# Patient Record
Sex: FEMALE | Race: WHITE | NOT HISPANIC OR LATINO | Employment: FULL TIME | URBAN - METROPOLITAN AREA
[De-identification: names, ages, dates, MRNs, and addresses within clinical notes are randomized per-mention and may not be internally consistent; named-entity substitution may affect disease eponyms.]

---

## 2002-04-02 LAB
ERYTHROCYTE [DISTWIDTH] IN BLOOD BY AUTOMATED COUNT: 12.7 % (ref 11.5–14.5)
HCT VFR BLD AUTO: 39.8 % (ref 37–47)
HEMOGLOBIN: 13.3 G/DL (ref 12–16)
MCV RBC AUTO: 97 FL (ref 80–100)
PLATELET # BLD AUTO: 264 10^9/L (ref 150–400)
RBC # BLD AUTO: 4.12 10^12/L (ref 4.2–5.4)
WBC # BLD AUTO: 8.7 10^9/L (ref 4.3–10.8)

## 2007-10-02 LAB — TSH SERPL-ACNC: 1.05 MCU/ML (ref 0.4–4.5)

## 2010-07-06 LAB
AST SERPL-CCNC: 22 U/L (ref 10–30)
CHOLEST SERPL-MCNC: 244 MG/DL (ref 125–200)
CHOLEST/HDLC SERPL: 2.3 {RATIO}
CREAT SERPL-MCNC: 0.82 MG/DL (ref 0.59–1.07)
GFR SERPL CREATININE-BSD FRML MDRD: >60 ML/MIN/1.73M2
GLUCOSE SERPL-MCNC: 75 MG/DL (ref 65–99)
HDLC SERPL-MCNC: 104 MG/DL
LDLC SERPL CALC-MCNC: 97 MG/DL
Lab: 41
TRIGL SERPL-MCNC: 216 MG/DL
TSH SERPL-ACNC: 0.94 MCU/ML (ref 0.4–4.5)
VITAMIN D2, 1,25 (OH)2 - QUEST: <4

## 2017-01-27 ENCOUNTER — TRANSFERRED RECORDS (OUTPATIENT)
Dept: HEALTH INFORMATION MANAGEMENT | Facility: CLINIC | Age: 47
End: 2017-01-27

## 2017-11-14 ENCOUNTER — VIRTUAL VISIT (OUTPATIENT)
Dept: FAMILY MEDICINE | Facility: OTHER | Age: 47
End: 2017-11-14

## 2017-11-15 NOTE — PROGRESS NOTES
"Date:   Clinician: Monica Hdez  Clinician NPI: [id number]  Patient: Annie Osborn  Patient : 1970  Patient Address: [address] [place] [address] , [place] [address]  Patient Phone: [phone]  Visit Protocol: UTI  Patient Summary:  Annie is a 47 year old ( : 1970 ) female who initiated a Visit for a presumed bladder infection. When asked the question \"Please sign me up to receive news, health information and promotions. \", Annie responded \"No\".    Her symptoms began 2 days ago and consist of urgency, dysuria, urinary frequency, and foul smelling urine.   Symptom Details   Urinary Frequency: Every hour    She denies abdominal pain, vaginal discharge, flank pain, vomiting, hesitation, recent antibiotic use, hematuria, urinary incontinence, feeling feverish, loss of appetite, chills, and nausea. Annie has never had kidney stones. She has not been hospitalized, been a patient in a nursing home, or had a catheter in the past two weeks. She denies risk factors for sexually transmitted infections.   Annie has had one (1) UTI in the past 12 months. Her most recent bladder infection was not within the last 4 weeks. Her current symptoms are similar to the previous UTI symptoms. She took ciprofloxacin for her last infection and found it to be effective.   She has experienced side effects (upset stomach, vomiting, or diarrhea) from taking Bactrim DS (trimethoprim/sulfamethoxazole). Annie does not get yeast infections when she takes antibiotics.   She denies pregnancy and denies breastfeeding. She does not menstruate.   She does NOT smoke or use smokeless tobacco.  MEDICATIONS:  No current medications   , ALLERGIES:   sulfa (Bactrim/Septra)    Clinician Response:  Dear Annie,  Based on the information you have provided, you likely have a bladder infection, also called an acute urinary tract infection (UTI).   To treat your infection, I am prescribing:   Nitrofurantoin (Macrobid). Swallow one " (1) tablet twice a day for 5 days. Take the tablet with food. Continue taking the tablets even if you feel better before all the medication is gone. There is no refill with this prescription.   Antibiotic selections by the provider are based on safety and effectiveness. You may or may not be prescribed the same medication that you took for your last bladder infection.   Some people develop allergies to antibiotics. If you notice a new rash, significant swelling, or difficulty breathing, stop the medication immediately and go into a clinic for physical evaluation.   To help treat your current UTI and prevent future occurrences, remember to:     Drink 8-10, 8-ounce glasses of water daily.    Urinate after sexual intercourse.    Wipe front to back after using the bathroom.     Some women may develop a yeast infection as a side effect of taking antibiotics. If you notice symptoms of a yeast infection, [facility] can help treat that condition as well. Simply log in and complete another Visit, which will cover all of the necessary questions to determine the best treatment for you.   You should visit a clinic for a follow-up visit if your symptoms do not improve in 1-2 days or if you experience another urinary tract infection soon after completing this treatment.  If you become pregnant during this course of treatment, stop taking the medication and contact your primary care provider.   Diagnosis: Acute Uncomplicated Bladder Infection  Diagnosis ICD: N39.0  Additional Clinician Notes: The FDA recently recommended against using Cipro for the treatment of uncomplicated UTIs due to the side effects and risks. For this reason, I am treating you with Macrobid instead.  Prescription: nitrofurantoin (Macrobid) 100mg oral tablet 10 tablets, 5 days supply. Take one tablet by mouth two times a day for 5 days. Refills: 0, Refill as needed: no, Allow substitutions: yes  Pharmacy: [id number] - [phone] - [address]  [place] [id number]

## 2018-10-18 ENCOUNTER — TRANSFERRED RECORDS (OUTPATIENT)
Dept: HEALTH INFORMATION MANAGEMENT | Facility: CLINIC | Age: 48
End: 2018-10-18

## 2018-10-19 LAB
T3FREE SERPL-MCNC: 3.5 PG/ML (ref 2–4.4)
T4 FREE SERPL-MCNC: 1.02 NG/DL (ref 0.82–1.77)
TSH BLD-ACNC: 1.84 UIU/ML (ref 0.45–4.5)

## 2019-02-12 ENCOUNTER — TRANSFERRED RECORDS (OUTPATIENT)
Dept: HEALTH INFORMATION MANAGEMENT | Facility: CLINIC | Age: 49
End: 2019-02-12

## 2019-02-21 ENCOUNTER — TRANSFERRED RECORDS (OUTPATIENT)
Dept: HEALTH INFORMATION MANAGEMENT | Facility: CLINIC | Age: 49
End: 2019-02-21

## 2019-08-30 ENCOUNTER — TRANSFERRED RECORDS (OUTPATIENT)
Dept: HEALTH INFORMATION MANAGEMENT | Facility: CLINIC | Age: 49
End: 2019-08-30

## 2019-11-05 ENCOUNTER — TRANSFERRED RECORDS (OUTPATIENT)
Dept: HEALTH INFORMATION MANAGEMENT | Facility: CLINIC | Age: 49
End: 2019-11-05

## 2019-11-05 LAB
CHOLEST SERPL-MCNC: 231 MG/DL (ref 100–199)
GLUCOSE (EXTERNAL): 85 MG/DL (ref 65–99)
HDLC SERPL-MCNC: 95 MG/DL
LDL/HDL RATIO: 1.1 (ref 0–3.2)
LDLC SERPL CALC-MCNC: 103 MG/DL (ref 0–99)
TRIGL SERPL-MCNC: 166 MG/DL (ref 0–149)
VLDL CHOLESTEROL: 33 (ref 5–49)

## 2020-03-05 ENCOUNTER — PRE VISIT (OUTPATIENT)
Dept: CARDIOLOGY | Facility: CLINIC | Age: 50
End: 2020-03-05

## 2020-03-23 ENCOUNTER — TELEPHONE (OUTPATIENT)
Dept: CARDIOLOGY | Facility: CLINIC | Age: 50
End: 2020-03-23

## 2020-03-23 NOTE — TELEPHONE ENCOUNTER
Called pt regarding upcoming visit with Dr. Luis on 3/25/20, no answer. Left VM requesting call back to Team 1.

## 2020-03-24 ENCOUNTER — TELEPHONE (OUTPATIENT)
Dept: CARDIOLOGY | Facility: CLINIC | Age: 50
End: 2020-03-24

## 2020-06-23 ENCOUNTER — ANCILLARY PROCEDURE (OUTPATIENT)
Dept: MAMMOGRAPHY | Facility: CLINIC | Age: 50
End: 2020-06-23
Attending: OBSTETRICS & GYNECOLOGY
Payer: COMMERCIAL

## 2020-06-23 DIAGNOSIS — Z12.31 VISIT FOR SCREENING MAMMOGRAM: ICD-10-CM

## 2020-06-23 PROCEDURE — 77067 SCR MAMMO BI INCL CAD: CPT

## 2020-07-24 ENCOUNTER — TELEPHONE (OUTPATIENT)
Dept: CARDIOLOGY | Facility: CLINIC | Age: 50
End: 2020-07-24

## 2020-07-27 ENCOUNTER — OFFICE VISIT (OUTPATIENT)
Dept: CARDIOLOGY | Facility: CLINIC | Age: 50
End: 2020-07-27
Payer: COMMERCIAL

## 2020-07-27 ENCOUNTER — TELEPHONE (OUTPATIENT)
Dept: CARDIOLOGY | Facility: CLINIC | Age: 50
End: 2020-07-27

## 2020-07-27 VITALS — HEART RATE: 73 BPM | DIASTOLIC BLOOD PRESSURE: 102 MMHG | WEIGHT: 146.4 LBS | SYSTOLIC BLOOD PRESSURE: 154 MMHG

## 2020-07-27 DIAGNOSIS — R00.2 PALPITATIONS: ICD-10-CM

## 2020-07-27 DIAGNOSIS — I10 ESSENTIAL HYPERTENSION: ICD-10-CM

## 2020-07-27 DIAGNOSIS — E78.5 HYPERLIPIDEMIA LDL GOAL <100: ICD-10-CM

## 2020-07-27 DIAGNOSIS — I10 ESSENTIAL HYPERTENSION: Primary | ICD-10-CM

## 2020-07-27 LAB
ANION GAP SERPL CALCULATED.3IONS-SCNC: 11.1 MMOL/L (ref 6–17)
BUN SERPL-MCNC: 13 MG/DL (ref 7–30)
CALCIUM SERPL-MCNC: 9.7 MG/DL (ref 8.5–10.5)
CHLORIDE SERPL-SCNC: 103 MMOL/L (ref 98–107)
CO2 SERPL-SCNC: 28 MMOL/L (ref 23–29)
CREAT SERPL-MCNC: 0.89 MG/DL (ref 0.7–1.3)
GFR SERPL CREATININE-BSD FRML MDRD: 67 ML/MIN/{1.73_M2}
GLUCOSE SERPL-MCNC: 96 MG/DL (ref 70–105)
POTASSIUM SERPL-SCNC: 4.1 MMOL/L (ref 3.5–5.1)
SODIUM SERPL-SCNC: 138 MMOL/L (ref 136–145)

## 2020-07-27 PROCEDURE — 99204 OFFICE O/P NEW MOD 45 MIN: CPT | Performed by: INTERNAL MEDICINE

## 2020-07-27 PROCEDURE — 80048 BASIC METABOLIC PNL TOTAL CA: CPT | Performed by: INTERNAL MEDICINE

## 2020-07-27 PROCEDURE — 93000 ELECTROCARDIOGRAM COMPLETE: CPT | Performed by: INTERNAL MEDICINE

## 2020-07-27 PROCEDURE — 36415 COLL VENOUS BLD VENIPUNCTURE: CPT | Performed by: INTERNAL MEDICINE

## 2020-07-27 RX ORDER — OMEGA-3 FATTY ACIDS/FISH OIL 300-1000MG
CAPSULE ORAL
COMMUNITY

## 2020-07-27 RX ORDER — VITAMIN E 268 MG
CAPSULE ORAL DAILY
COMMUNITY
End: 2022-04-11

## 2020-07-27 RX ORDER — MULTIPLE VITAMINS W/ MINERALS TAB 9MG-400MCG
1 TAB ORAL DAILY
COMMUNITY

## 2020-07-27 RX ORDER — LISINOPRIL/HYDROCHLOROTHIAZIDE 10-12.5 MG
1 TABLET ORAL DAILY
Qty: 30 TABLET | Refills: 11 | Status: SHIPPED | OUTPATIENT
Start: 2020-07-27 | End: 2020-09-15

## 2020-07-27 NOTE — LETTER
7/27/2020    Francine Isbell MD  [facility] [address]  [facility] [address]    RE: Dot Osborn       Dear Colleague,    I had the pleasure of seeing Dot Osborn in the [facility].    HPI and Plan:   I had the pleasure of seeing Courtney Osborn in cardiology clinic on request of [facility].  She was referred here for hypertension.    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  Over the last 1 year she has noticed her blood pressure has been elevated.  This was first noticed at the follow-up visit with her OB/GYN.  She initially tried to manage it with diet modification, lower stress as well as regular exercise.  However her blood pressures at home with her home instrument have remained elevated.  She states that the diastolic has been consistently over 100 mm.  She does not follow a low-salt diet.  She likes to add salt to her foods.  However, she is committed now to change that.    She used to exercise regularly in the gym before the coronavirus pandemic.  She did both weights and some cardio and had no chest pain or shortness of breath.  Her main symptom has been intermittent flutter-like sensation that lasts 4 to 5 minutes.  It occurs about once a week and has been ongoing for last 1 year.  It is not associate with dizziness or syncope.  There is no history of orthopnea, PND or edema feet.    EKG done today was reviewed by me and revealed sinus bradycardia.    Her lipids are abnormal.  Total cholesterol was 231 last year,  and HDL was 95.  Triglycerides were elevated at 166.  She states that her TSH was done last year through her OB/GYN which was apparently normal.  I do not have the results.  I do not have her baseline BMP.      She does have family issue of CAD in her grandmother.  Her parents and siblings have no history of coronary artery disease.    She smokes about 10  cigarettes/month.  She is been smoking for several years although she quit when she had kids.      Physical exam  See below    Impression    1.  Essential hypertension  Patient clearly has hypertension.  Blood pressure today is 150/104.  Blood pressures have been consistently elevated at home as well as in her primary care provider's office.  I discussed with her the importance of blood pressure control in terms of reducing future cardiovascular events.  She is committed to lower in salt intake in her diet and continue regular exercise.  I will obtain a baseline BMP and if that is stable, start her on lisinopril hydrochlorothiazide 10/12.5 mg daily.  This can increase as tolerated.  Side effects of dry cough, angioedema, allergic reactions, dizziness electrolyte imbalance were discussed.  We will do a follow-up BMP after she has been on this medication for few weeks.  I also recommend an echocardiogram to assess for any effects of hypertension on the myocardium and valves.  Once blood pressure is well controlled, I would like to proceed with evaluation of CAD by either doing a stress echocardiogram.  We can also offer her a CT calcium score at next visit for re-stratification if she is willing.    2.  Palpitations  She has intermittent flutter-like sensations last for few minutes.  It is been ongoing for a year.  I would recommend a 14-day ZIO patch monitor to assess this.  Given uncontrolled hypertension, atrial arrhythmias are possible in her case.  If significant palpitations noted, she might need additional beta-blocker.    3.  Hyperlipidemia with elevated LDL and triglycerides but very high HDL of 95  Continue low-fat diet for now.  She might benefit from CT coronary calcium score for stratification at some point.    4.  Smoking, I discussed importance of smoking cessation.  Further discussion can be done at next visit.    Thank you for allowing us to part spent in care of this nice patient.  She will return to  see my nurse practitioner in follow-up after the above tests are completed.      Sincerely,    Liu Ibarra MD      Orders Placed This Encounter   Procedures     Basic metabolic panel     Basic metabolic panel     Follow-Up with Cardiac Advanced Practice Provider     EKG 12-lead complete w/read - Clinics (performed today)     Leadless EKG Monitor 3 to 14 Days     Echocardiogram Complete       Orders Placed This Encounter   Medications     multivitamin w/minerals (MULTI-VITAMIN) tablet     Sig: Take 1 tablet by mouth daily     vitamin E (TOCOPHEROL) 400 units (360 mg) capsule     Sig: Take by mouth daily     omega 3 1000 MG CAPS     lisinopril-hydrochlorothiazide (ZESTORETIC) 10-12.5 MG tablet     Sig: Take 1 tablet by mouth daily     Dispense:  30 tablet     Refill:  11       There are no discontinued medications.      Encounter Diagnoses   Name Primary?     Essential hypertension Yes     Palpitations      Hyperlipidemia LDL goal <100        CURRENT MEDICATIONS:  Current Outpatient Medications   Medication Sig Dispense Refill     lisinopril-hydrochlorothiazide (ZESTORETIC) 10-12.5 MG tablet Take 1 tablet by mouth daily 30 tablet 11     multivitamin w/minerals (MULTI-VITAMIN) tablet Take 1 tablet by mouth daily       omega 3 1000 MG CAPS        vitamin E (TOCOPHEROL) 400 units (360 mg) capsule Take by mouth daily         ALLERGIES   Allergies no known allergies    PAST MEDICAL HISTORY:  Past Medical History:   Diagnosis Date     Elevated BP without diagnosis of hypertension        PAST SURGICAL HISTORY:  History reviewed. No pertinent surgical history.    FAMILY HISTORY:  Family History   Problem Relation Age of Onset     Coronary Artery Disease Maternal Grandmother        SOCIAL HISTORY:  Social History     Socioeconomic History     Marital status:      Spouse name: None     Number of children: None     Years of education: None     Highest education level: None   Occupational History     None   Social Needs      Financial resource strain: None     Food insecurity     Worry: None     Inability: None     Transportation needs     Medical: None     Non-medical: None   Tobacco Use     Smoking status: Current Some Day Smoker     Types: Cigarettes     Smokeless tobacco: Never Used     Tobacco comment: rarely smokes, 10/month   Substance and Sexual Activity     Alcohol use: Yes     Comment: occasionally     Drug use: None     Sexual activity: None   Lifestyle     Physical activity     Days per week: None     Minutes per session: None     Stress: None   Relationships     Social connections     Talks on phone: None     Gets together: None     Attends Buddhist service: None     Active member of club or organization: None     Attends meetings of clubs or organizations: None     Relationship status: None     Intimate partner violence     Fear of current or ex partner: None     Emotionally abused: None     Physically abused: None     Forced sexual activity: None   Other Topics Concern     Parent/sibling w/ CABG, MI or angioplasty before 65F 55M? Not Asked   Social History Narrative     None       Review of Systems:  Skin:  Negative       Eyes:  Positive for contacts    ENT:  Negative      Respiratory:  Negative       Cardiovascular:    Positive for;palpitations;fatigue occas. fatigue from no sleep. palp for over a year  Gastroenterology: Negative      Genitourinary:  Negative      Musculoskeletal:  Positive for arthritis RA  Neurologic:  Positive for numbness or tingling of hands finger tips  Psychiatric:  Positive for anxiety;sleep disturbances doesn't sleep,gets a few hrs sleep a night  Heme/Lymph/Imm:  Negative      Endocrine:  Negative        Physical Exam:  Vitals: BP (!) 154/102   Pulse 73   Wt 66.4 kg (146 lb 6.4 oz)     Constitutional:    thin      Skin:  warm and dry to the touch          Head:  normocephalic        Eyes:  EOMS intact        Lymph:No Cervical lymphadenopathy present     ENT:  no pallor or cyanosis         Neck:  JVP normal        Respiratory:  clear to auscultation         Cardiac: regular rhythm;normal S1 and S2     no presence of murmur                                                   GI:  not assessed this visit        Extremities and Muscular Skeletal:                 Neurological:  no gross motor deficits        Psych:  Alert and Oriented x 3          Thank you for allowing me to participate in the care of your patient.    Sincerely,     Liu Ibarra MD     [facility]

## 2020-07-27 NOTE — PROGRESS NOTES
HPI and Plan:   I had the pleasure of seeing Courtney Osborn in cardiology clinic on request of [facility].  She was referred here for hypertension.    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  Over the last 1 year she has noticed her blood pressure has been elevated.  This was first noticed at the follow-up visit with her OB/GYN.  She initially tried to manage it with diet modification, lower stress as well as regular exercise.  However her blood pressures at home with her home instrument have remained elevated.  She states that the diastolic has been consistently over 100 mm.  She does not follow a low-salt diet.  She likes to add salt to her foods.  However, she is committed now to change that.    She used to exercise regularly in the gym before the coronavirus pandemic.  She did both weights and some cardio and had no chest pain or shortness of breath.  Her main symptom has been intermittent flutter-like sensation that lasts 4 to 5 minutes.  It occurs about once a week and has been ongoing for last 1 year.  It is not associate with dizziness or syncope.  There is no history of orthopnea, PND or edema feet.    EKG done today was reviewed by me and revealed sinus bradycardia.    Her lipids are abnormal.  Total cholesterol was 231 last year,  and HDL was 95.  Triglycerides were elevated at 166.  She states that her TSH was done last year through her OB/GYN which was apparently normal.  I do not have the results.  I do not have her baseline BMP.      She does have family issue of CAD in her grandmother.  Her parents and siblings have no history of coronary artery disease.    She smokes about 10 cigarettes/month.  She is been smoking for several years although she quit when she had kids.      Physical exam  See below    Impression    1.  Essential hypertension  Patient clearly has hypertension.  Blood pressure today is 150/104.  Blood pressures have been  consistently elevated at home as well as in her primary care provider's office.  I discussed with her the importance of blood pressure control in terms of reducing future cardiovascular events.  She is committed to lower in salt intake in her diet and continue regular exercise.  I will obtain a baseline BMP and if that is stable, start her on lisinopril hydrochlorothiazide 10/12.5 mg daily.  This can increase as tolerated.  Side effects of dry cough, angioedema, allergic reactions, dizziness electrolyte imbalance were discussed.  We will do a follow-up BMP after she has been on this medication for few weeks.  I also recommend an echocardiogram to assess for any effects of hypertension on the myocardium and valves.  Once blood pressure is well controlled, I would like to proceed with evaluation of CAD by either doing a stress echocardiogram.  We can also offer her a CT calcium score at next visit for re-stratification if she is willing.    2.  Palpitations  She has intermittent flutter-like sensations last for few minutes.  It is been ongoing for a year.  I would recommend a 14-day ZIO patch monitor to assess this.  Given uncontrolled hypertension, atrial arrhythmias are possible in her case.  If significant palpitations noted, she might need additional beta-blocker.    3.  Hyperlipidemia with elevated LDL and triglycerides but very high HDL of 95  Continue low-fat diet for now.  She might benefit from CT coronary calcium score for stratification at some point.    4.  Smoking, I discussed importance of smoking cessation.  Further discussion can be done at next visit.    Thank you for allowing us to part spent in care of this nice patient.  She will return to see my nurse practitioner in follow-up after the above tests are completed.      Sincerely,    Liu Ibarra MD      Orders Placed This Encounter   Procedures     Basic metabolic panel     Basic metabolic panel     Follow-Up with Cardiac Advanced Practice Provider      EKG 12-lead complete w/read - Clinics (performed today)     Leadless EKG Monitor 3 to 14 Days     Echocardiogram Complete       Orders Placed This Encounter   Medications     multivitamin w/minerals (MULTI-VITAMIN) tablet     Sig: Take 1 tablet by mouth daily     vitamin E (TOCOPHEROL) 400 units (360 mg) capsule     Sig: Take by mouth daily     omega 3 1000 MG CAPS     lisinopril-hydrochlorothiazide (ZESTORETIC) 10-12.5 MG tablet     Sig: Take 1 tablet by mouth daily     Dispense:  30 tablet     Refill:  11       There are no discontinued medications.      Encounter Diagnoses   Name Primary?     Essential hypertension Yes     Palpitations      Hyperlipidemia LDL goal <100        CURRENT MEDICATIONS:  Current Outpatient Medications   Medication Sig Dispense Refill     lisinopril-hydrochlorothiazide (ZESTORETIC) 10-12.5 MG tablet Take 1 tablet by mouth daily 30 tablet 11     multivitamin w/minerals (MULTI-VITAMIN) tablet Take 1 tablet by mouth daily       omega 3 1000 MG CAPS        vitamin E (TOCOPHEROL) 400 units (360 mg) capsule Take by mouth daily         ALLERGIES   Allergies no known allergies    PAST MEDICAL HISTORY:  Past Medical History:   Diagnosis Date     Elevated BP without diagnosis of hypertension        PAST SURGICAL HISTORY:  History reviewed. No pertinent surgical history.    FAMILY HISTORY:  Family History   Problem Relation Age of Onset     Coronary Artery Disease Maternal Grandmother        SOCIAL HISTORY:  Social History     Socioeconomic History     Marital status:      Spouse name: None     Number of children: None     Years of education: None     Highest education level: None   Occupational History     None   Social Needs     Financial resource strain: None     Food insecurity     Worry: None     Inability: None     Transportation needs     Medical: None     Non-medical: None   Tobacco Use     Smoking status: Current Some Day Smoker     Types: Cigarettes     Smokeless tobacco: Never  Used     Tobacco comment: rarely smokes, 10/month   Substance and Sexual Activity     Alcohol use: Yes     Comment: occasionally     Drug use: None     Sexual activity: None   Lifestyle     Physical activity     Days per week: None     Minutes per session: None     Stress: None   Relationships     Social connections     Talks on phone: None     Gets together: None     Attends [employer] service: None     Active member of club or organization: None     Attends meetings of clubs or organizations: None     Relationship status: None     Intimate partner violence     Fear of current or ex partner: None     Emotionally abused: None     Physically abused: None     Forced sexual activity: None   Other Topics Concern     Parent/sibling w/ CABG, MI or angioplasty before 65F 55M? Not Asked   Social History Narrative     None       Review of Systems:  Skin:  Negative       Eyes:  Positive for contacts    ENT:  Negative      Respiratory:  Negative       Cardiovascular:    Positive for;palpitations;fatigue occas. fatigue from no sleep. palp for over a year  Gastroenterology: Negative      Genitourinary:  Negative      Musculoskeletal:  Positive for arthritis RA  Neurologic:  Positive for numbness or tingling of hands finger tips  Psychiatric:  Positive for anxiety;sleep disturbances doesn't sleep,gets a few hrs sleep a night  Heme/Lymph/Imm:  Negative      Endocrine:  Negative        Physical Exam:  Vitals: BP (!) 154/102   Pulse 73   Wt 66.4 kg (146 lb 6.4 oz)     Constitutional:    thin      Skin:  warm and dry to the touch          Head:  normocephalic        Eyes:  EOMS intact        Lymph:No Cervical lymphadenopathy present     ENT:  no pallor or cyanosis        Neck:  JVP normal        Respiratory:  clear to auscultation         Cardiac: regular rhythm;normal S1 and S2     no presence of murmur                                                   GI:  not assessed this visit        Extremities and Muscular Skeletal:                  Neurological:  no gross motor deficits        Psych:  Alert and Oriented x 3        CC  Francine Isbell MD  [facility]  [address]  [place] [id number]

## 2020-07-27 NOTE — TELEPHONE ENCOUNTER
Called Pt left message informing her labs normal and she can start zestoretic. Asked she call and leave message she had received nurse message. Also asked her to call if develops any side effects discussed by DR. MAGALI Avilez RN

## 2020-07-29 NOTE — TELEPHONE ENCOUNTER
Pt did start Zestoretic 7/28/20. Did contact scheduling and asked them to move all appointments out to get lab done and holter before office visit.  MAGALI Avilez RN

## 2020-07-30 ENCOUNTER — DOCUMENTATION ONLY (OUTPATIENT)
Dept: CARDIOLOGY | Facility: CLINIC | Age: 50
End: 2020-07-30

## 2020-07-31 ENCOUNTER — TELEPHONE (OUTPATIENT)
Dept: CARDIOLOGY | Facility: CLINIC | Age: 50
End: 2020-07-31

## 2020-08-10 ENCOUNTER — TELEPHONE (OUTPATIENT)
Dept: CARDIOLOGY | Facility: CLINIC | Age: 50
End: 2020-08-10

## 2020-08-10 NOTE — TELEPHONE ENCOUNTER
Pt called she again needs to change appointment is going out of town. She will call scheduling to reschedule. Pt knows she needs 14 days between OV and mailing in zio sandhya. MAGALI Avilez RN

## 2020-08-20 ENCOUNTER — HOSPITAL ENCOUNTER (OUTPATIENT)
Dept: CARDIOLOGY | Facility: CLINIC | Age: 50
End: 2020-08-20
Attending: INTERNAL MEDICINE
Payer: COMMERCIAL

## 2020-08-20 DIAGNOSIS — I10 ESSENTIAL HYPERTENSION: ICD-10-CM

## 2020-08-20 DIAGNOSIS — R00.2 PALPITATIONS: ICD-10-CM

## 2020-08-20 PROCEDURE — 0296T LEADLESS EKG MONITOR 3 TO 14 DAYS: CPT

## 2020-08-20 PROCEDURE — 93306 TTE W/DOPPLER COMPLETE: CPT | Mod: 26 | Performed by: INTERNAL MEDICINE

## 2020-08-20 PROCEDURE — 0298T LEADLESS EKG MONITOR 3 TO 14 DAYS: CPT | Performed by: INTERNAL MEDICINE

## 2020-08-20 PROCEDURE — 93306 TTE W/DOPPLER COMPLETE: CPT

## 2020-09-15 ENCOUNTER — VIRTUAL VISIT (OUTPATIENT)
Dept: CARDIOLOGY | Facility: CLINIC | Age: 50
End: 2020-09-15
Payer: COMMERCIAL

## 2020-09-15 ENCOUNTER — DOCUMENTATION ONLY (OUTPATIENT)
Dept: CARDIOLOGY | Facility: CLINIC | Age: 50
End: 2020-09-15

## 2020-09-15 DIAGNOSIS — E78.5 HYPERLIPIDEMIA LDL GOAL <100: Primary | ICD-10-CM

## 2020-09-15 DIAGNOSIS — R00.2 PALPITATIONS: ICD-10-CM

## 2020-09-15 DIAGNOSIS — I10 ESSENTIAL HYPERTENSION: ICD-10-CM

## 2020-09-15 PROCEDURE — 99214 OFFICE O/P EST MOD 30 MIN: CPT | Mod: 95 | Performed by: NURSE PRACTITIONER

## 2020-09-15 RX ORDER — LISINOPRIL/HYDROCHLOROTHIAZIDE 10-12.5 MG
1 TABLET ORAL DAILY
Qty: 90 TABLET | Refills: 3 | Status: SHIPPED | OUTPATIENT
Start: 2020-09-15 | End: 2020-09-15

## 2020-09-15 RX ORDER — IRBESARTAN AND HYDROCHLOROTHIAZIDE 150; 12.5 MG/1; MG/1
1 TABLET, FILM COATED ORAL DAILY
Qty: 30 TABLET | Refills: 3 | Status: SHIPPED | OUTPATIENT
Start: 2020-09-15 | End: 2021-02-01

## 2020-09-15 NOTE — PATIENT INSTRUCTIONS
Stop Lisinopril/hct and start irbestartan/hct 150/12.5mg daily    Schedule a CT coronary calcium score    Schedule a lab draw in 2 weeks and have your blood pressure cuff checked at your clinic in [place]   My nurse will facilitate  Jodie [phone]    See me back in 6 weeks for a video visit  My office will contact you to schedule.

## 2020-09-15 NOTE — PROGRESS NOTES
Can you call [facility] in [place] and make sure they know I want lisinopril hydrocholorothiazide changed to avalide    I had sent the rx and the she told me about her cough    And can you arrange/send order for BMP and nurse visit for BP check and to assess her BP cuff at her [facility]    thanks

## 2020-09-15 NOTE — PROGRESS NOTES
"Review Of Systems  Skin: negative  Eyes: negative  Ears/Nose/Throat: negative  Respiratory: No shortness of breath, dyspnea on exertion, cough, or hemoptysis  Cardiovascular: negative  Gastrointestinal: negative  Genitourinary: negative  Musculoskeletal: negative  Neurologic: negative  Psychiatric: negative  Hematologic/Lymphatic/Immunologic: negative  Endocrine: negative    Vitals - Patient Reported  Systolic (Patient Reported): (does not check  VS at home)  Weight (Patient Reported): 65.8 kg (145 lb)  Height (Patient Reported): 166.4 cm (5' 5.5\")  BMI (Based on Pt Reported Ht/Wt): 23.76      June Annie Osborn is a 50 year old female who is being evaluated via a billable video visit.      The patient has been notified of following:     \"This video visit will be conducted via a call between you and your physician/provider. We have found that certain health care needs can be provided without the need for an in-person physical exam.  This service lets us provide the care you need with a video conversation.  If a prescription is necessary we can send it directly to your pharmacy.  If lab work is needed we can place an order for that and you can then stop by our lab to have the test done at a later time.    Video visits are billed at different rates depending on your insurance coverage.  Please reach out to your insurance provider with any questions.    If during the course of the call the physician/provider feels a video visit is not appropriate, you will not be charged for this service.\"    Patient has given verbal consent for Video visit? Yes  How would you like to obtain your AVS? Mail a copy  If you are dropped from the video visit, the video invite should be resent to: Text to cell phone: [phone]  Will anyone else be joining your video visit? No    Reviewed:  KEVIN Huitron  09/15/20    Video-Visit Details    Type of service:  Video Visit    Video Start Time: 245pm  Video End Time: 320pm    Originating " Location (pt. Location): Other car    Distant Location (provider location):  [facility]     Platform used for Video Visit: RICHARD Mendiola CNP      Video-Visit Details      History of Present Illness:    Dot Osborn is a pleasant 50-year-old female who was recently referred to Dr. Ibarra by OB/GYN for hypertension.  She returns today to follow-up on her blood pressure, echocardiogram, and to discuss her Zio- patch monitor.  For the past year she has noted elevated blood pressures and try to manage this with lifestyle modifications without success.  Her diastolic blood pressure had consistently been over 100 mmHg.  She does add salt to her foods and Dr. Ibarra talked with her about this at the last office visit.      Prior to the COVID pandemic she was exercising in the gym with no chest pain or shortness of breath.  She has had self-limiting fluttering sensations in her chest lasting up to 4 or 5 minutes, occurring once a week.  This is been intermittent for 1 year.  These are not associated with syncope or dizziness.  EKG was done at her initial visit showing sinus bradycardia    Her lipid panel is abnormal with a total cholesterol of 231, , HDL 95.  Triglycerides are 166.  She states the TSH last year through OB/GYN was normal.  Those results were not available.    She has a family history of coronary artery disease in her grandmother but not her parents.  She smokes 10 cigarettes a month and smoking cessation counseling was discussed at the last visit. She reports today that she has stopped smoking.    Her electrolyte panel was checked the end of July showing a sodium of 138 potassium 4.1 BUN 13 creatinine 0.89.  She was subsequently placed on lisinopril-hydrochlorothiazide 10-12.5 mg once daily. The blood pressures she has checked are high at 135 to 141. 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 She has developed a dry, significant cough since Lisinopril with no fevers.    ZIO Patch: 1 self-limiting 5 beat run of supraventricular tachycardia with no other arrhythmias  Echo: Ejection fraction 65 to 70% with no left ventricular hypertrophy or valvular dysfunction    Once her blood pressure was controlled Dr. Ibarra was considering a stress echocardiogram.  He also thought a CT calcium score would assist in risk stratification for statin therapy. I have discussed this with her and she is interested in CT coronary calcium score    She denies chest pain or sob. Palpitations are intermittent. She has started limiting sodium in her diet.    General Appearance:    no distress, normal body habitus, upright.    ENT/Mouth:    membranes moist, no nasal discharge or bleeding gums. Normal head shape, no evidence of injury or laceration.    EYES:    no scleral icterus, normal conjunctivae    Neck:    no evidence of thyromegaly.     Chest/Lungs:    No audible wheezing equal chest wall expansion. Non labored breathing. No cough.    Cardiovascular:    No evidence of elevated jugular venous pressure. No evidence of pitting edema bilaterally    Abdomen:    no evidence of abdominal distention. No observed jaundice.    Extremities:    no cyanosis or clubbing noted.    Skin:    no xanthelasma, normal skin collar. No evidence of facial lacerations.    Neurologic:    Normal arm motion bilateral, no tremors. No evidence of focal defect.    Psychiatric:    alert and oriented x3, calm    Impression/plan:    1.  Hypertension  -No evidence of end organ damage on echocardiogram  -bp still high  -she has developed a significant cough on Lisinopril/HCT    PLAN:  -start irbesartan/hct 150/12.5mg daily  -we will arrange a bmp at [facility]  -see if they can check your blood pressure and make sure your cuff is accurate      2.  Dyslipidemia  She is agreeable to a CT coronary calcium score to risk stratify    3.   Fluttering in her chest  -Likely SVT as found on monitor  -she is under much stress with distance teaching, does not sleep well and is remodeling her house to try to move  -we discussed adding beta-blockers or diltiazem but she would prefer to stick with one med for bp if possible    4.  Tobacco abuse  She has stopped smoking    Follow up with me in 6 weeks after CTA and bp check /labs at PCP clinic  Type of service:  Video Visit    Video Start Time: 245pm  Video End Time: 320pm    Originating Location (pt. Location): Other car    Distant Location (provider location):  [facility]     Platform used for Video Visit: RICHARD Mendiola CNP

## 2020-09-15 NOTE — LETTER
"9/15/2020    Francine Isbell MD  [facility] [address]  [facility] [address]    RE: Dot Osborn       Dear Colleague,    I had the pleasure of seeing Dot Osborn in the [facility].    Review Of Systems  Skin: negative  Eyes: negative  Ears/Nose/Throat: negative  Respiratory: No shortness of breath, dyspnea on exertion, cough, or hemoptysis  Cardiovascular: negative  Gastrointestinal: negative  Genitourinary: negative  Musculoskeletal: negative  Neurologic: negative  Psychiatric: negative  Hematologic/Lymphatic/Immunologic: negative  Endocrine: negative    Vitals - Patient Reported  Systolic (Patient Reported): (does not check  VS at home)  Weight (Patient Reported): 65.8 kg (145 lb)  Height (Patient Reported): 166.4 cm (5' 5.5\")  BMI (Based on Pt Reported Ht/Wt): 23.76      Dot Osborn is a 50 year old female who is being evaluated via a billable video visit.      The patient has been notified of following:     \"This video visit will be conducted via a call between you and your physician/provider. We have found that certain health care needs can be provided without the need for an in-person physical exam.  This service lets us provide the care you need with a video conversation.  If a prescription is necessary we can send it directly to your pharmacy.  If lab work is needed we can place an order for that and you can then stop by our lab to have the test done at a later time.    Video visits are billed at different rates depending on your insurance coverage.  Please reach out to your insurance provider with any questions.    If during the course of the call the physician/provider feels a video visit is not appropriate, you will not be charged for this service.\"    Patient has given verbal consent for Video visit? Yes  How would you like to obtain your AVS? Mail a copy  If you are dropped from the video visit, the video invite " should be resent to: Text to cell phone: [phone]  Will anyone else be joining your video visit? No    Reviewed:  Tomy KEVIN  09/15/20    Video-Visit Details    Type of service:  Video Visit    Video Start Time: 245pm  Video End Time: 320pm    Originating Location (pt. Location): Other car    Distant Location (provider location):  [facility]     Platform used for Video Visit: RICHARD Mendiola CNP      Video-Visit Details      History of Present Illness:    Dot Osborn is a pleasant 50-year-old female who was recently referred to Dr. Ibarra by OB/GYN for hypertension.  She returns today to follow-up on her blood pressure, echocardiogram, and to discuss her Zio- patch monitor.  For the past year she has noted elevated blood pressures and try to manage this with lifestyle modifications without success.  Her diastolic blood pressure had consistently been over 100 mmHg.  She does add salt to her foods and Dr. Ibarra talked with her about this at the last office visit.      Prior to the COVID pandemic she was exercising in the gym with no chest pain or shortness of breath.  She has had self-limiting fluttering sensations in her chest lasting up to 4 or 5 minutes, occurring once a week.  This is been intermittent for 1 year.  These are not associated with syncope or dizziness.  EKG was done at her initial visit showing sinus bradycardia    Her lipid panel is abnormal with a total cholesterol of 231, , HDL 95.  Triglycerides are 166.  She states the TSH last year through OB/GYN was normal.  Those results were not available.    She has a family history of coronary artery disease in her grandmother but not her parents.  She smokes 10 cigarettes a month and smoking cessation counseling was discussed at the last visit. She reports today that she has stopped smoking.    Her electrolyte panel was checked the end of July showing a sodium of 138 potassium 4.1 BUN  13 creatinine 0.89.  She was subsequently placed on lisinopril-hydrochlorothiazide 10-12.5 mg once daily. The blood pressures she has checked are high at 135 to 141. 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 She has developed a dry, significant cough since Lisinopril with no fevers.    ZIO Patch: 1 self-limiting 5 beat run of supraventricular tachycardia with no other arrhythmias  Echo: Ejection fraction 65 to 70% with no left ventricular hypertrophy or valvular dysfunction    Once her blood pressure was controlled Dr. Ibarra was considering a stress echocardiogram.  He also thought a CT calcium score would assist in risk stratification for statin therapy. I have discussed this with her and she is interested in CT coronary calcium score    She denies chest pain or sob. Palpitations are intermittent. She has started limiting sodium in her diet.    General Appearance:    no distress, normal body habitus, upright.    ENT/Mouth:    membranes moist, no nasal discharge or bleeding gums. Normal head shape, no evidence of injury or laceration.    EYES:    no scleral icterus, normal conjunctivae    Neck:    no evidence of thyromegaly.     Chest/Lungs:    No audible wheezing equal chest wall expansion. Non labored breathing. No cough.    Cardiovascular:    No evidence of elevated jugular venous pressure. No evidence of pitting edema bilaterally    Abdomen:    no evidence of abdominal distention. No observed jaundice.    Extremities:    no cyanosis or clubbing noted.    Skin:    no xanthelasma, normal skin collar. No evidence of facial lacerations.    Neurologic:    Normal arm motion bilateral, no tremors. No evidence of focal defect.    Psychiatric:    alert and oriented x3, calm    Impression/plan:    1.  Hypertension  -No evidence of end organ damage on echocardiogram  -bp still high  -she has developed a significant cough on  Lisinopril/HCT    PLAN:  -start irbesartan/hct 150/12.5mg daily  -we will arrange a bmp at [facility]  -see if they can check your blood pressure and make sure your cuff is accurate      2.  Dyslipidemia  She is agreeable to a CT coronary calcium score to risk stratify    3.  Fluttering in her chest  -Likely SVT as found on monitor  -she is under much stress with distance teaching, does not sleep well and is remodeling her house to try to move  -we discussed adding beta-blockers or diltiazem but she would prefer to stick with one med for bp if possible    4.  Tobacco abuse  She has stopped smoking    Follow up with me in 6 weeks after CTA and bp check /labs at PCP clinic  Type of service:  Video Visit    Video Start Time: 245pm  Video End Time: 320pm    Originating Location (pt. Location): Other car    Distant Location (provider location):  [facility]     Platform used for Video Visit: [employer]        Thank you for allowing me to participate in the care of your patient.    Sincerely,     RICHARD Mott CNP     [facility]

## 2020-09-16 NOTE — PROGRESS NOTES
Called pt, she requested a call back tomorrow as she is currently busy at work.  Will plan to call pt tomorrow AM.    TEMO Vásquez 5:17 PM 9/16/2020

## 2020-09-17 NOTE — PROGRESS NOTES
Called and spoke with pt.  She confirms she  irbesartan/hctz ([facility]ide) Rx at her pharmacy. Reviewed need for BMP and BP check with clinic cuff and her home cuff in 2 weeks (~ 9/29/20) and she agrees to call her [facility] clinic to get these done there- order faxed to [facility] for BMP and BP check on or around 9/29/20.     Pt also agrees to call [facility] scheduling to get CT coronary calcium scan scheduled.     TEMO Vásquez 10:40 AM 9/17/2020

## 2020-10-02 NOTE — PROGRESS NOTES
Called pt, no answer, left voicemail reminding her to schedule BMP and BP check at [facility], as they have not been done yet (per update from [facility] when I called requesting results to be faxed). Also reminded pt to schedule CT coronary calcium scan with cardiology (scheduling number provided).  Requested return call when she gets BMP and BP check done so that results can be requested.     TEMO Vásquez 4:11 PM 10/2/2020

## 2021-02-01 DIAGNOSIS — I10 ESSENTIAL HYPERTENSION: ICD-10-CM

## 2021-02-01 RX ORDER — IRBESARTAN AND HYDROCHLOROTHIAZIDE 150; 12.5 MG/1; MG/1
1 TABLET, FILM COATED ORAL DAILY
Qty: 30 TABLET | Refills: 1 | Status: SHIPPED | OUTPATIENT
Start: 2021-02-01 | End: 2021-02-26

## 2021-02-16 ENCOUNTER — TRANSFERRED RECORDS (OUTPATIENT)
Dept: HEALTH INFORMATION MANAGEMENT | Facility: CLINIC | Age: 51
End: 2021-02-16

## 2021-02-16 LAB
BUN SERPL-MCNC: 16 MG/DL (ref 7–18)
CALCIUM SERPL-MCNC: 9.5 MG/DL (ref 8.5–10.1)
CHLORIDE SERPLBLD-SCNC: 99 MMOL/L (ref 98–107)
CREAT SERPL-MCNC: 0.97 MG/DL (ref 0.51–0.95)
GFR SERPL CREATININE-BSD FRML MDRD: >60 ML/MIN/1.73M2 (ref 60–150)
GLUCOSE SERPL-MCNC: 118 MG/DL (ref 74–100)
HCO3 SERPL-SCNC: 32 MMOL/L (ref 21–32)
POTASSIUM SERPL-SCNC: 3.9 MMOL/L (ref 3.5–5.1)
SODIUM SERPL-SCNC: 138 MMOL/L (ref 136–145)

## 2021-02-17 ENCOUNTER — CARE COORDINATION (OUTPATIENT)
Dept: CARDIOLOGY | Facility: CLINIC | Age: 51
End: 2021-02-17

## 2021-02-17 NOTE — PROGRESS NOTES
"Received fax from [facility] with pt's BMP results from 2/16/21. Results entered in Epic and below for review:  Component      Latest Ref Rng & Units 7/27/2020 2/16/2021   Sodium      136 - 145 mmol/L 138 138   Potassium      3.5 - 5.1 mmol/L 4.1 3.9   Chloride      98 - 107 mmol/L 103 99   Carbon Dioxide      23 - 29 mmol/L 28    Anion Gap      6 - 17 mmol/L 11.1    Glucose      74 - 100 mg/dL 96 118 (A)   Urea Nitrogen      7 - 18 mg/dL 13 16   Creatinine      0.51 - 0.95 mg/dL 0.89 0.97 (A)   GFR Estimate      60 - 150 ml/min/1.73m2 67 >60   GFR Estimate If Black      >60 mL/min/1.73:m2 81    Calcium      8.5 - 10.1 mg/dL 9.7 9.5   Bicarbonate Blood      21 - 32 mmol/L  32     Per chart review, pt's most recent visit with SHERMAN Luevano was on 9/15/20 and pt instructions that day were:  \"Stop Lisinopril/hct and start irbestartan/hct 150/12.5mg daily     Schedule a CT coronary calcium score     Schedule a lab draw in 2 weeks and have your blood pressure cuff checked at your clinic in [place]   My nurse will facilitate  Jodie [phone]     See me back in 6 weeks for a video visit\"    Order was faxed to [facility] for BMP and BP check on or around 9/29/20.   Called pt and left voicemail on 10/2/20 reminding her to get BMP and BP check done and schedule CT coronary calcium scan.     Pt is scheduled for visit with SHERMAN Luevano on 2/26/20.   CT coronary calcium scan has not been done and is not scheduled at this time.     Routed to SHERMAN Luevano as SIDDHARTHA Vásquez RN 10:47 AM 2/17/2021    "

## 2021-02-24 NOTE — PROGRESS NOTES
Called [facility] and they confirmed that pt just had lab appt on 2/16/21 and has not had a nurse appt for BP check.     Called and spoke with pt. She reports she brought her home BP cuff with her to the lab appt on 2/16 at the [facility] but was told by the lab personal that they didn't do BP checks. Advised pt bring her home BP cuff with to her visit with SHERMAN Luevano on 2/26/21 and will check cuff then- she agrees with this plan.    TEMO Vásquez 3:51 PM 2/24/2021

## 2021-02-26 ENCOUNTER — OFFICE VISIT (OUTPATIENT)
Dept: CARDIOLOGY | Facility: CLINIC | Age: 51
End: 2021-02-26
Attending: NURSE PRACTITIONER
Payer: COMMERCIAL

## 2021-02-26 ENCOUNTER — DOCUMENTATION ONLY (OUTPATIENT)
Dept: CARDIOLOGY | Facility: CLINIC | Age: 51
End: 2021-02-26

## 2021-02-26 VITALS
HEIGHT: 65 IN | HEART RATE: 71 BPM | DIASTOLIC BLOOD PRESSURE: 78 MMHG | WEIGHT: 153 LBS | SYSTOLIC BLOOD PRESSURE: 122 MMHG | BODY MASS INDEX: 25.49 KG/M2

## 2021-02-26 DIAGNOSIS — I10 ESSENTIAL HYPERTENSION: Primary | ICD-10-CM

## 2021-02-26 DIAGNOSIS — E78.5 HYPERLIPIDEMIA LDL GOAL <100: ICD-10-CM

## 2021-02-26 PROCEDURE — 99213 OFFICE O/P EST LOW 20 MIN: CPT | Performed by: NURSE PRACTITIONER

## 2021-02-26 RX ORDER — IRBESARTAN AND HYDROCHLOROTHIAZIDE 150; 12.5 MG/1; MG/1
1 TABLET, FILM COATED ORAL DAILY
Qty: 90 TABLET | Refills: 3 | Status: SHIPPED | OUTPATIENT
Start: 2021-02-26 | End: 2022-04-12

## 2021-02-26 ASSESSMENT — MIFFLIN-ST. JEOR: SCORE: 1304.88

## 2021-02-26 NOTE — PROGRESS NOTES
Can you send an order to [facility] for a fasting lipid/alt please    Need a new baseline

## 2021-02-26 NOTE — PATIENT INSTRUCTIONS
Same dose of Irbesartan/HCT    Schedule a CT calcium score and stress echocardiogram    Schedule a fasting cholesterol test at your [facility].

## 2021-02-26 NOTE — PROGRESS NOTES
History of Present Illness:     Dot Osborn is a pleasant 50-year-old female who is here to follow up on her med changes for hypertension. She was referred to Dr. Ibarra by OB/GYN for hypertension. She had developed a cough on Lisinopril/HCT and I changed to Irbesartan/HCT. Her cough has resolved. I asked her to have her BP cuff checked at her [facility] but this did not get done. She brought her cuff in here today and after a few checks, if anything, her cuff likely runs a bit higher than ours. Her home numbers have been mostly 128-135/80          Prior to the COVID pandemic she was exercising in the gym with no chest pain or shortness of breath.  She has had self-limiting fluttering sensations in her chest lasting up to 4 or 5 minutes, occurring once a week.  This is been intermittent for 1 year.  These are not associated with syncope or dizziness.  EKG was done at her initial visit showing sinus bradycardia. Previous ZIO Patch: 1 self-limiting 5 beat run of supraventricular tachycardia with no other arrhythmias. The fluttering sensation has actually resolved.       Her lipid panel is abnormal with a total cholesterol of 231, , HDL 95.  Triglycerides are 166.  She states the TSH last year through OB/GYN was normal.  Those results were not available.     She has a family history of coronary artery disease in her grandmother but not her parents.  She smokes 10 cigarettes a month but she has now stopped.     BMP on med change above:  normal-see scanned report    Echo: Ejection fraction 65 to 70% with no left ventricular hypertrophy or valvular dysfunction     Once her blood pressure was controlled Dr. Ibarra was considering a stress echocardiogram.  He also thought a CT calcium score would assist in risk stratification for statin therapy. I have discussed this with her and she is interested in CT coronary calcium score and the stress echo. Her lipids were last checked in  so we will get a new lipid  panel done in [place] at her PMD.    She had been under stress remodeling her house to get it ready to sell-it went on the market today. She [place]  [place] point.      She has started limiting sodium in her diet.       Impression/Plan:     1.  Hypertension  -No evidence of end organ damage on echocardiogram  -BP controlled  -home cuff runs a few points high  -cough on Lisinopril/HCT-improved on Irbesartan/HCT  BMP normal    PLAN:   continue Irbesartan/hct 150/12.5mg daily      2.  Dyslipidemia  She is agreeable to a CT coronary calcium score to risk stratify  I have sent an order to [facility] to recheck lipid panel.     3.  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     4.  Tobacco abuse  She has stopped smoking     5. We suggest a CT calcium score and stress echo now that BP is controlled  We will schedule at her convenience and I will see her back after  She hopes to arrange for these in the next 6-8 weeks    It has been a pleasure seeing [place] Annie Osborn in follow up    25 minutes spent on the date of the encounter doing chart review, review of outside records, review of test results, patient visit and documentation     Yessy Ng, MSN, APRN-BC, CNP  Cardiology    Orders Placed This Encounter   Procedures     CT Coronary Calcium Scan     Follow-Up with Cardiac Advanced Practice Provider     Exercise Stress Echocardiogram     Orders Placed This Encounter   Medications     irbesartan-hydrochlorothiazide (AVALIDE) 150-12.5 MG tablet     Sig: Take 1 tablet by mouth daily     Dispense:  90 tablet     Refill:  3     Medications Discontinued During This  Encounter   Medication Reason     irbesartan-hydrochlorothiazide (AVALIDE) 150-12.5 MG tablet Reorder         Encounter Diagnoses   Name Primary?     Hyperlipidemia LDL goal <100      Essential hypertension Yes       CURRENT MEDICATIONS:  Current Outpatient Medications   Medication Sig Dispense Refill     irbesartan-hydrochlorothiazide (AVALIDE) 150-12.5 MG tablet Take 1 tablet by mouth daily 90 tablet 3     multivitamin w/minerals (MULTI-VITAMIN) tablet Take 1 tablet by mouth daily       omega 3 1000 MG CAPS        vitamin E (TOCOPHEROL) 400 units (360 mg) capsule Take by mouth daily         ALLERGIES     Allergies   Allergen Reactions     Lisinopril      cough       PAST MEDICAL HISTORY:  Past Medical History:   Diagnosis Date     Elevated BP without diagnosis of hypertension        PAST SURGICAL HISTORY:  History reviewed. No pertinent surgical history.    FAMILY HISTORY:  Family History   Problem Relation Age of Onset     Coronary Artery Disease Maternal Grandmother        SOCIAL HISTORY:  Social History     Socioeconomic History     Marital status:      Spouse name: None     Number of children: None     Years of education: None     Highest education level: None   Occupational History     None   Social Needs     Financial resource strain: None     Food insecurity     Worry: None     Inability: None     Transportation needs     Medical: None     Non-medical: None   Tobacco Use     Smoking status: Former Smoker     Types: Cigarettes     Smokeless tobacco: Never Used     Tobacco comment: rarely smokes, 10/month   Substance and Sexual Activity     Alcohol use: Yes     Comment: occasionally     Drug use: None     Sexual activity: None   Lifestyle     Physical activity     Days per week: None     Minutes per session: None     Stress: None   Relationships     Social connections     Talks on phone: None     Gets together: None     Attends Sikhism service: None     Active member of club or organization: None      "Attends meetings of clubs or organizations: None     Relationship status: None     Intimate partner violence     Fear of current or ex partner: None     Emotionally abused: None     Physically abused: None     Forced sexual activity: None   Other Topics Concern     Parent/sibling w/ CABG, MI or angioplasty before 65F 55M? Not Asked   Social History Narrative     None       Review of Systems:  Skin:  Negative       Eyes:  Positive for contacts    ENT:  Negative      Respiratory:  Positive for wheezing     Cardiovascular:  Negative      Gastroenterology: Negative      Genitourinary:  Negative      Musculoskeletal:  Positive for arthritis Both forearms and no wrists.  No flare-up for a few montrhs.  Neurologic:  Negative      Psychiatric:  Negative      Heme/Lymph/Imm:  Positive for allergies    Endocrine:  Negative        Physical Exam:  Vitals: /78   Pulse 71   Ht 1.651 m (5' 5\")   Wt 69.4 kg (153 lb)   BMI 25.46 kg/m      Constitutional:    thin      Skin:  warm and dry to the touch          Head:  normocephalic        Eyes:  EOMS intact        Lymph:No Cervical lymphadenopathy present     ENT:  no pallor or cyanosis        Neck:  JVP normal        Respiratory:  clear to auscultation         Cardiac: regular rhythm;normal S1 and S2     no presence of murmur                                                   GI:  not assessed this visit        Extremities and Muscular Skeletal:                 Neurological:  no gross motor deficits        Psych:  Alert and Oriented x 3      Recent Lab Results:  LIPID RESULTS:  Lab Results   Component Value Date    CHOL 231 (H) 11/05/2019    HDL 95 11/05/2019     (H) 11/05/2019    TRIG 166 (H) 11/05/2019    CHOLHDLRATIO 2.3 07/06/2010       LIVER ENZYME RESULTS:  Lab Results   Component Value Date    AST 22 07/06/2010       CBC RESULTS:  Lab Results   Component Value Date    WBC 8.7 04/02/2002    RBC 4.12 (L) 04/02/2002    HGB 13.3 04/02/2002    HCT 39.8 04/02/2002    " MCV 97 04/02/2002    RDW 12.7 04/02/2002     04/02/2002       BMP RESULTS:  Lab Results   Component Value Date     02/16/2021    POTASSIUM 3.9 02/16/2021    CHLORIDE 99 02/16/2021    CO2 28 07/27/2020    ANIONGAP 11.1 07/27/2020     (A) 02/16/2021    BUN 16 02/16/2021    CR 0.97 (A) 02/16/2021    GFRESTIMATED >60 02/16/2021    GFRESTBLACK 81 07/27/2020    CARRIE 9.5 02/16/2021        A1C RESULTS:  No results found for: A1C    INR RESULTS:  No results found for: INR        CC  RICHARD Mott CNP  [address]  [place] [address]

## 2021-02-26 NOTE — LETTER
2/26/2021    Francine Isbell MD  [facility] [address]  [place] [address]    RE: Dot Osborn       Dear Colleague,    I had the pleasure of seeing Dot Osborn in the [facility].        History of Present Illness:     Dot Osborn is a pleasant 50-year-old female who is here to follow up on her med changes for hypertension. She was referred to Dr. Ibarra by OB/GYN for hypertension. She had developed a cough on Lisinopril/HCT and I changed to Irbesartan/HCT. Her cough has resolved. I asked her to have her BP cuff checked at her [place] clinic but this did not get done. She brought her cuff in here today and after a few checks, if anything, her cuff likely runs a bit higher than ours. Her home numbers have been mostly 128-135/80          Prior to the COVID pandemic she was exercising in the gym with no chest pain or shortness of breath.  She has had self-limiting fluttering sensations in her chest lasting up to 4 or 5 minutes, occurring once a week.  This is been intermittent for 1 year.  These are not associated with syncope or dizziness.  EKG was done at her initial visit showing sinus bradycardia. Previous ZIO Patch: 1 self-limiting 5 beat run of supraventricular tachycardia with no other arrhythmias. The fluttering sensation has actually resolved.       Her lipid panel is abnormal with a total cholesterol of 231, , HDL 95.  Triglycerides are 166.  She states the TSH last year through OB/GYN was normal.  Those results were not available.     She has a family history of coronary artery disease in her grandmother but not her parents.  She smokes 10 cigarettes a month but she has now stopped.     BMP on med change above:  normal-see scanned report    Echo: Ejection fraction 65 to 70% with no left ventricular hypertrophy or valvular dysfunction     Once her blood pressure was controlled Dr. Ibarra was  considering a stress echocardiogram.  He also thought a CT calcium score would assist in risk stratification for statin therapy. I have discussed this with her and she is interested in CT coronary calcium score and the stress echo. Her lipids were last checked in  so we will get a new lipid panel done in [place] at her PMD.    She had been under stress remodeling her house to get it ready to sell-it went on the market today. She and her  [place] point.      She has started limiting sodium in her diet.       Impression/Plan:     1.  Hypertension  -No evidence of end organ damage on echocardiogram  -BP controlled  -home cuff runs a few points high  -cough on Lisinopril/HCT-improved on Irbesartan/HCT  BMP normal    PLAN:   continue Irbesartan/hct 150/12.5mg daily      2.  Dyslipidemia  She is agreeable to a CT coronary calcium score to risk stratify  I have sent an order to [facility] to recheck lipid panel.     3.  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     4.  Tobacco abuse  She has stopped smoking     5. We suggest a CT calcium score and stress echo now that BP is controlled  We will schedule at her convenience and I will see her back after  She hopes to arrange for these in the next 6-8 weeks    It has been a pleasure seeing Dot Osborn in follow up    25 minutes spent on the date of the encounter doing chart review, review of outside records, review of test results, patient visit and documentation     Yessy Ng, MSN, APRN-BC, CNP  Cardiology    Orders Placed This Encounter   Procedures     CT Coronary Calcium Scan     Follow-Up with Cardiac  Advanced Practice Provider     Exercise Stress Echocardiogram     Orders Placed This Encounter   Medications     irbesartan-hydrochlorothiazide (AVALIDE) 150-12.5 MG tablet     Sig: Take 1 tablet by mouth daily     Dispense:  90 tablet     Refill:  3     Medications Discontinued During This Encounter   Medication Reason     irbesartan-hydrochlorothiazide (AVALIDE) 150-12.5 MG tablet Reorder         Encounter Diagnoses   Name Primary?     Hyperlipidemia LDL goal <100      Essential hypertension Yes       CURRENT MEDICATIONS:  Current Outpatient Medications   Medication Sig Dispense Refill     irbesartan-hydrochlorothiazide (AVALIDE) 150-12.5 MG tablet Take 1 tablet by mouth daily 90 tablet 3     multivitamin w/minerals (MULTI-VITAMIN) tablet Take 1 tablet by mouth daily       omega 3 1000 MG CAPS        vitamin E (TOCOPHEROL) 400 units (360 mg) capsule Take by mouth daily         ALLERGIES     Allergies   Allergen Reactions     Lisinopril      cough       PAST MEDICAL HISTORY:  Past Medical History:   Diagnosis Date     Elevated BP without diagnosis of hypertension        PAST SURGICAL HISTORY:  History reviewed. No pertinent surgical history.    FAMILY HISTORY:  Family History   Problem Relation Age of Onset     Coronary Artery Disease Maternal Grandmother        SOCIAL HISTORY:  Social History     Socioeconomic History     Marital status:      Spouse name: None     Number of children: None     Years of education: None     Highest education level: None   Occupational History     None   Social Needs     Financial resource strain: None     Food insecurity     Worry: None     Inability: None     Transportation needs     Medical: None     Non-medical: None   Tobacco Use     Smoking status: Former Smoker     Types: Cigarettes     Smokeless tobacco: Never Used     Tobacco comment: rarely smokes, 10/month   Substance and Sexual Activity     Alcohol use: Yes     Comment: occasionally     Drug use: None     Sexual  "activity: None   Lifestyle     Physical activity     Days per week: None     Minutes per session: None     Stress: None   Relationships     Social connections     Talks on phone: None     Gets together: None     Attends [employer] service: None     Active member of club or organization: None     Attends meetings of clubs or organizations: None     Relationship status: None     Intimate partner violence     Fear of current or ex partner: None     Emotionally abused: None     Physically abused: None     Forced sexual activity: None   Other Topics Concern     Parent/sibling w/ CABG, MI or angioplasty before 65F 55M? Not Asked   Social History Narrative     None       Review of Systems:  Skin:  Negative       Eyes:  Positive for contacts    ENT:  Negative      Respiratory:  Positive for wheezing     Cardiovascular:  Negative      Gastroenterology: Negative      Genitourinary:  Negative      Musculoskeletal:  Positive for arthritis Both forearms and no wrists.  No flare-up for a few montrhs.  Neurologic:  Negative      Psychiatric:  Negative      Heme/Lymph/Imm:  Positive for allergies    Endocrine:  Negative        Physical Exam:  Vitals: /78   Pulse 71   Ht 1.651 m (5' 5\")   Wt 69.4 kg (153 lb)   BMI 25.46 kg/m      Constitutional:    thin      Skin:  warm and dry to the touch          Head:  normocephalic        Eyes:  EOMS intact        Lymph:No Cervical lymphadenopathy present     ENT:  no pallor or cyanosis        Neck:  JVP normal        Respiratory:  clear to auscultation         Cardiac: regular rhythm;normal S1 and S2     no presence of murmur                                                   GI:  not assessed this visit        Extremities and Muscular Skeletal:                 Neurological:  no gross motor deficits        Psych:  Alert and Oriented x 3      Recent Lab Results:  LIPID RESULTS:  Lab Results   Component Value Date    CHOL 231 (H) 11/05/2019    HDL 95 11/05/2019     (H) 11/05/2019 "    TRIG 166 (H) 11/05/2019    CHOLHDLRATIO 2.3 07/06/2010       LIVER ENZYME RESULTS:  Lab Results   Component Value Date    AST 22 07/06/2010       CBC RESULTS:  Lab Results   Component Value Date    WBC 8.7 04/02/2002    RBC 4.12 (L) 04/02/2002    HGB 13.3 04/02/2002    HCT 39.8 04/02/2002    MCV 97 04/02/2002    RDW 12.7 04/02/2002     04/02/2002       BMP RESULTS:  Lab Results   Component Value Date     02/16/2021    POTASSIUM 3.9 02/16/2021    CHLORIDE 99 02/16/2021    CO2 28 07/27/2020    ANIONGAP 11.1 07/27/2020     (A) 02/16/2021    BUN 16 02/16/2021    CR 0.97 (A) 02/16/2021    GFRESTIMATED >60 02/16/2021    GFRESTBLACK 81 07/27/2020    CARRIE 9.5 02/16/2021        A1C RESULTS:  No results found for: A1C    INR RESULTS:  No results found for: INR        CC  RICHARD Mott CNP  [address]  [place] [address]    Thank you for allowing me to participate in the care of your patient.      Sincerely,     RICHARD Mott CNP     [facility]  cc:   RICHARD Mott CNP  [address]  [place] [address]

## 2021-03-01 NOTE — PROGRESS NOTES
Faxed order to [facility] (fx: [phone]) for FLP/ALT to be done in the next 1-2 months.     TEMO Vásquez 10:54 AM 3/1/2021

## 2021-03-13 ENCOUNTER — HEALTH MAINTENANCE LETTER (OUTPATIENT)
Age: 51
End: 2021-03-13

## 2021-04-09 ENCOUNTER — TRANSFERRED RECORDS (OUTPATIENT)
Dept: HEALTH INFORMATION MANAGEMENT | Facility: CLINIC | Age: 51
End: 2021-04-09
Payer: COMMERCIAL

## 2021-07-22 ENCOUNTER — TRANSFERRED RECORDS (OUTPATIENT)
Dept: HEALTH INFORMATION MANAGEMENT | Facility: CLINIC | Age: 51
End: 2021-07-22
Payer: COMMERCIAL

## 2021-08-28 ENCOUNTER — HEALTH MAINTENANCE LETTER (OUTPATIENT)
Age: 51
End: 2021-08-28

## 2021-09-21 ENCOUNTER — E-VISIT (OUTPATIENT)
Dept: URGENT CARE | Facility: URGENT CARE | Age: 51
End: 2021-09-21
Payer: COMMERCIAL

## 2021-09-21 DIAGNOSIS — Z20.822 SUSPECTED COVID-19 VIRUS INFECTION: Primary | ICD-10-CM

## 2021-09-21 PROCEDURE — 99421 OL DIG E/M SVC 5-10 MIN: CPT | Performed by: PHYSICIAN ASSISTANT

## 2021-09-21 NOTE — PATIENT INSTRUCTIONS
Dear Dot Osborn,    Your symptoms show that you may have coronavirus (COVID-19). This illness can cause fever, cough and trouble breathing. Many people get a mild case and get better on their own. Some people can get very sick.    Will I be tested for COVID-19?  We would like to test you for Covid-19 virus. I have placed orders for this test.     To schedule: go to your [employer] home page and scroll down to the section that says  You have an appointment that needs to be scheduled  and click the large green button that says  Schedule Now  and follow the steps to find the next available openings.    If you are unable to complete these Home[employer]here scheduling steps, please call [phone] to schedule your testing.     Return to work/school/ guidance:  Please let your workplace manager and staffing office know when your quarantine ends     We can t give you an exact date as it depends on the above. You can calculate this on your own or work with your manager/staffing office to calculate this. (For example if you were exposed on 10/4, you would have to quarantine for 14 full days. That would be through 10/18. You could return on 10/19.)      If you receive a positive COVID-19 test result, follow the guidance of the those who are giving you the results. Usually the return to work is 10 (or in some cases 20 days from symptom onset.) If you work at [facility], you must also be cleared by Employee Occupational Health and Safety to return to work.        If you receive a negative COVID-19 test result and did not have a high risk exposure to someone with a known positive COVID-19 test, you can return to work once you're free of fever for 24 hours without fever-reducing medication and your symptoms are improving or resolved.      If you receive a negative COVID-19 test and If you had a high risk exposure to someone who has tested positive for COVID-19 then you can return to work 14 days after your last  contact with the positive individual    Note: If you have ongoing exposure to the covid positive person, this quarantine period may be more than 14 days. (For example, if you are continued to be exposed to your child who tested positive and cannot isolate from them, then the quarantine of 7-14 days can't start until your child is no longer contagious. This is typically 10 days from onset of the child's symptoms. So the total duration may be 17-24 days in this case.)    Sign up for [employer].   We know it's scary to hear that you might have COVID-19. We want to track your symptoms to make sure you're okay over the next 2 weeks. Please look for an email from [employer]--this is a free, online program that we'll use to keep in touch. To sign up, follow the link in the email you will receive. Learn more at [email or web address]    How can I take care of myself?    Get lots of rest. Drink extra fluids (unless a doctor has told you not to)    Take Tylenol (acetaminophen) or ibuprofen for fever or pain. If you have liver or kidney problems, ask your family doctor if it's okay to take Tylenol o ibuprofen    If you have other health problems (like cancer, heart failure, an organ transplant or severe kidney disease): Call your specialty clinic if you don't feel better in the next 2 days.    Know when to call 911. Emergency warning signs include:  o Trouble breathing or shortness of breath  o Pain or pressure in the chest that doesn't go away  o Feeling confused like you haven't felt before, or not being able to wake up  o Bluish-colored lips or face    Where can I get more information?  [facility] - About COVID-19:   [email or web address]    CDC - What to Do If You're Sick:   www.cdc.gov/coronavirus/2019-ncov/about/steps-when-sick.html    September 21, 2021  RE:  Dot Osborn                                                                                                                   [address]  [facility] [place] [address]      To whom it may concern:    I evaluated Dot Osborn on September 21, 2021. Dot Osborn should be excused from work/school.     They should let their workplace manager and staffing office know when their quarantine ends.    We can not give an exact date as it depends on the information below. They can calculate this on their own or work with their manager/staffing office to calculate this. (For example if they were exposed on 10/04, they would have to quarantine for 14 full days. That would be through 10/18. They could return on 10/19.)    Quarantine Guidelines:      If patient receives a positive COVID-19 test result, they should follow the guidance of those who are giving the results. Usually the return to work is 10 (or in some cases 20 days from symptom onset.) If they work at [employer], they must be cleared by Employee Occupational Health and Safety to return to work.        If patient receives a negative COVID-19 test result and did not have a high risk exposure to someone with a known positive COVID-19 test, they can return to work once they're free of fever for 24 hours without fever-reducing medication and their symptoms are improving or resolved.      If patient receives a negative COVID-19 test and if they had a high risk exposure to someone who has tested positive for COVID-19 then they can return to work 14 days after their last contact with the positive individual    Note: If there is ongoing exposure to the covid positive person, this quarantine period may be longer than 14 days. (For example, if they are continually exposed to their child, who tested positive and cannot isolate from them, then the quarantine of 7-14 days can't start until their child is no longer contagious. This is typically 10 days from onset to the child's symptoms. So the total duration may be 17-24 days in this case.)     Sincerely,  Zeke Michelle,  LORELEI

## 2021-10-23 ENCOUNTER — HEALTH MAINTENANCE LETTER (OUTPATIENT)
Age: 51
End: 2021-10-23

## 2022-03-25 ENCOUNTER — CARE COORDINATION (OUTPATIENT)
Dept: CARDIOLOGY | Facility: CLINIC | Age: 52
End: 2022-03-25
Payer: COMMERCIAL

## 2022-03-25 ENCOUNTER — TELEPHONE (OUTPATIENT)
Dept: CARDIOLOGY | Facility: CLINIC | Age: 52
End: 2022-03-25
Payer: COMMERCIAL

## 2022-03-25 DIAGNOSIS — I10 ESSENTIAL HYPERTENSION: ICD-10-CM

## 2022-03-25 DIAGNOSIS — E78.5 HYPERLIPIDEMIA LDL GOAL <100: Primary | ICD-10-CM

## 2022-03-25 DIAGNOSIS — R00.2 PALPITATIONS: ICD-10-CM

## 2022-03-25 NOTE — PROGRESS NOTES
Patient called in to say that she has moved to [place], and needs to switch providers and get her testing scheduled, hoping to complete most or all in the [place] area.    She asked if she needs to have testing, what tests are really necessary, and when does she need to have them.    Writer has entered new orders for the CT Calcium score test and follow up visit and exercise stress test. Sent [place] message to patient.  Writer called to let her know that she can see a provider in [place], but the testing cannot be done there, unfortunately,    Faith Foreman RN, BSN   Team 3  [phone]

## 2022-03-25 NOTE — TELEPHONE ENCOUNTER
[facility] Call Center    Phone Message    May a detailed message be left on voicemail: yes     Reason for Call: Order(s): Other:   Reason for requested: Update order for CT Coronary Calcium scan and Stress test   Date needed: anytime  Provider name: Edith  PT has moved to [facility] and would sherin to be seen there   Pls send to in-clinic staff to schedule      Action Taken: Other: cardiology    Travel Screening: Not Applicable

## 2022-03-25 NOTE — TELEPHONE ENCOUNTER
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     called back at 1655 and left message with the update for Annie.  She wanted to know if she can have her tests completed at the [place] location (no), and if she can switch to a provider that goes to the [facility] (yes),     has entered updated orders for the VINNIE appointment, CT Calcium score, and STress Echo.

## 2022-04-09 ENCOUNTER — HEALTH MAINTENANCE LETTER (OUTPATIENT)
Age: 52
End: 2022-04-09

## 2022-04-11 ENCOUNTER — OFFICE VISIT (OUTPATIENT)
Dept: FAMILY MEDICINE | Facility: CLINIC | Age: 52
End: 2022-04-11
Payer: COMMERCIAL

## 2022-04-11 VITALS
DIASTOLIC BLOOD PRESSURE: 76 MMHG | HEART RATE: 82 BPM | BODY MASS INDEX: 25.39 KG/M2 | SYSTOLIC BLOOD PRESSURE: 124 MMHG | OXYGEN SATURATION: 99 % | HEIGHT: 66 IN | WEIGHT: 158 LBS | TEMPERATURE: 97.5 F

## 2022-04-11 DIAGNOSIS — G47.00 INSOMNIA, UNSPECIFIED TYPE: ICD-10-CM

## 2022-04-11 DIAGNOSIS — M06.9 RHEUMATOID ARTHRITIS INVOLVING MULTIPLE SITES, UNSPECIFIED WHETHER RHEUMATOID FACTOR PRESENT (H): ICD-10-CM

## 2022-04-11 DIAGNOSIS — Z00.00 ROUTINE GENERAL MEDICAL EXAMINATION AT A HEALTH CARE FACILITY: Primary | ICD-10-CM

## 2022-04-11 DIAGNOSIS — I10 ESSENTIAL HYPERTENSION: ICD-10-CM

## 2022-04-11 DIAGNOSIS — M25.571 PAIN IN JOINT, ANKLE AND FOOT, RIGHT: ICD-10-CM

## 2022-04-11 DIAGNOSIS — E78.5 HYPERLIPIDEMIA LDL GOAL <100: ICD-10-CM

## 2022-04-11 LAB
ANION GAP SERPL CALCULATED.3IONS-SCNC: 3 MMOL/L (ref 3–14)
BUN SERPL-MCNC: 21 MG/DL (ref 7–30)
CALCIUM SERPL-MCNC: 8.3 MG/DL (ref 8.5–10.1)
CHLORIDE BLD-SCNC: 107 MMOL/L (ref 94–109)
CHOLEST SERPL-MCNC: 186 MG/DL
CO2 SERPL-SCNC: 30 MMOL/L (ref 20–32)
CREAT SERPL-MCNC: 0.8 MG/DL (ref 0.52–1.04)
CRP SERPL-MCNC: <2.9 MG/L (ref 0–8)
ERYTHROCYTE [SEDIMENTATION RATE] IN BLOOD BY WESTERGREN METHOD: 9 MM/HR (ref 0–30)
FASTING STATUS PATIENT QL REPORTED: NO
GFR SERPL CREATININE-BSD FRML MDRD: 88 ML/MIN/1.73M2
GLUCOSE BLD-MCNC: 102 MG/DL (ref 70–99)
HDLC SERPL-MCNC: 71 MG/DL
LDLC SERPL CALC-MCNC: 52 MG/DL
NONHDLC SERPL-MCNC: 115 MG/DL
POTASSIUM BLD-SCNC: 3.8 MMOL/L (ref 3.4–5.3)
SODIUM SERPL-SCNC: 140 MMOL/L (ref 133–144)
TRIGL SERPL-MCNC: 313 MG/DL

## 2022-04-11 PROCEDURE — 86431 RHEUMATOID FACTOR QUANT: CPT | Performed by: PHYSICIAN ASSISTANT

## 2022-04-11 PROCEDURE — 80061 LIPID PANEL: CPT | Performed by: PHYSICIAN ASSISTANT

## 2022-04-11 PROCEDURE — 99396 PREV VISIT EST AGE 40-64: CPT | Mod: 25 | Performed by: PHYSICIAN ASSISTANT

## 2022-04-11 PROCEDURE — 90471 IMMUNIZATION ADMIN: CPT | Performed by: PHYSICIAN ASSISTANT

## 2022-04-11 PROCEDURE — 99214 OFFICE O/P EST MOD 30 MIN: CPT | Mod: 25 | Performed by: PHYSICIAN ASSISTANT

## 2022-04-11 PROCEDURE — 80048 BASIC METABOLIC PNL TOTAL CA: CPT | Performed by: PHYSICIAN ASSISTANT

## 2022-04-11 PROCEDURE — 86140 C-REACTIVE PROTEIN: CPT | Performed by: PHYSICIAN ASSISTANT

## 2022-04-11 PROCEDURE — 36415 COLL VENOUS BLD VENIPUNCTURE: CPT | Performed by: PHYSICIAN ASSISTANT

## 2022-04-11 PROCEDURE — 90750 HZV VACC RECOMBINANT IM: CPT | Performed by: PHYSICIAN ASSISTANT

## 2022-04-11 PROCEDURE — 85652 RBC SED RATE AUTOMATED: CPT | Performed by: PHYSICIAN ASSISTANT

## 2022-04-11 RX ORDER — FLUTICASONE PROPIONATE 50 MCG
1 SPRAY, SUSPENSION (ML) NASAL DAILY
COMMUNITY

## 2022-04-11 RX ORDER — TRAZODONE HYDROCHLORIDE 50 MG/1
50-200 TABLET, FILM COATED ORAL AT BEDTIME
Qty: 90 TABLET | Refills: 1 | Status: SHIPPED | OUTPATIENT
Start: 2022-04-11 | End: 2022-06-07

## 2022-04-11 RX ORDER — PREDNISONE 20 MG/1
TABLET ORAL
Qty: 20 TABLET | Refills: 0 | Status: SHIPPED | OUTPATIENT
Start: 2022-04-11 | End: 2022-05-02

## 2022-04-11 ASSESSMENT — ENCOUNTER SYMPTOMS
DIARRHEA: 0
DIZZINESS: 0
FREQUENCY: 0
SORE THROAT: 0
COUGH: 0
ARTHRALGIAS: 1
CONSTIPATION: 0
HEARTBURN: 0
MYALGIAS: 1
WEAKNESS: 0
CHILLS: 0
BREAST MASS: 0
SHORTNESS OF BREATH: 0
ABDOMINAL PAIN: 0
EYE PAIN: 0
HEMATOCHEZIA: 0
PALPITATIONS: 0
NAUSEA: 0
FEVER: 0
DYSURIA: 0
HEADACHES: 0
NERVOUS/ANXIOUS: 0
JOINT SWELLING: 1
PARESTHESIAS: 0
HEMATURIA: 0

## 2022-04-11 ASSESSMENT — PAIN SCALES - GENERAL: PAINLEVEL: EXTREME PAIN (8)

## 2022-04-11 NOTE — PROGRESS NOTES
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 cerebritis  The increased cerebral edema is causing contraction of the bilateral lateral    ventricles  Areas of low-attenuation in the left frontoparietal lobe possibly related to ischemia  Interval placement of a left frontal approach ventriculostomy catheter with tip overlying the foramen of Montalvo  Inferior bifrontal and right temporal lobe hemorrhagic contusions again identified  Similar hygroma noted along the cerebral falx  Increased herniation of parenchyma through the craniectomy site  Drains remain in place  Numerous, previously described facial and calvarial fractures                       Workstation performed: [id number]         CT head wo contrast   Final Result by Berry Voss MD (06/03 0744)      Increasing edema throughout right greater than left hemisphere concern for ischemia  Other considerations include posttraumatic cytotoxic edema, less likely cerebritis  Trapping of the right temporal horn with interval increase in volume of multifocal hygromas resulting in increasing herniation of brain parenchyma through the wide right frontoparietal craniectomy flap  Numerous, previously described facial and calvarial fractures  Findings consistent with preliminary report issued by [facility]  Workstation performed: [id number]         VAS lower limb venous duplex study, complete bilateral   Final Result by Rhett Stephenson MD (06/01 1921)      XR chest portable   Final Result by Maribel Burden DO (06/02 1510)      Left basilar retrocardiac consolidation with air bronchograms may represent atelectasis or pneumonia  Workstation performed: [id number]         CT head wo contrast   Final Result by Angel Luis Stock DO (06/01 0240)      No significant interval change compared to prior study  Workstation performed: [id number]         XR chest portable   Final Result by Jena Camargo MD (05/31 0879)   1  Slightly increased bibasilar opacities, most likely atelectasis, with other etiologies not excluded on the basis of portable chest radiograph  2   Mild cardiomegaly is new, which may be at least partially reflective of AP projection and degree of inspiration  Workstation performed: [id number]         CT head wo contrast   Final Result by Darcy Christianson DO (05/30 1646)      Status post right hemicraniectomy with expected postoperative change within the overlying soft tissues  Stable small hemorrhagic contusions within the frontal lobes inferiorly, right greater than left with moderate surrounding edema  Improving small subdural hemorrhages  There is no new hemorrhage identified        Stable extensive facial and calvarial fractures with hemorrhage noted throughout the paranasal sinuses  Workstation performed: [id number]         CT orbits/temporal bones/skull base wo contrast   Final Result by Ed Hartman DO (05/30 3279)      Complex left mastoid temporal bone fracture primarily longitudinal in orientation extends through the middle ear with disruption of the ossicular chain  The fracture does not appear to involve inner ears structures but is inseparable from the facial    nerve Canal and posterior lateral aspect of the carotid canal   Resulting opacification of the mastoid air cells and middle ear  Fracture extends superiorly into the squamosal temporal bone with disruption of the sutures similar to prior CT of the    brain  Patient has undergone recent partial hemicraniectomy on the right  Partial opacification of the right mastoid air cells and middle ear cavity with no middle ear or inner ear fractures  Workstation performed: [id number]         CT head without contrast   Final Result by Enrike Handy DO (05/30 0191)      Continued evolution of hemorrhagic contusions      Slightly smaller right-sided middle cranial fossa subdural hematoma  Extensive calvarial fracture is similar to prior study  Workstation performed: [id number]         XR chest portable ICU   Final Result by Sri Srinivasan MD (05/30 6888)      1  No acute cardiopulmonary disease  2   Sidehole of the enteric tube overlies the gastroesophageal junction  Workstation performed: [id number]         CT head wo contrast   Final Result by Rose Marie Tran MD (05/29 5196)      Continued evolution of hemorrhagic contusions  No significant interval change in the size of bilateral extra-axial, likely subdural hemorrhages, right larger than left  Mild subarachnoid hemorrhage is noted significantly changed        Questioned possible small hemorrhages within the anterior aspect of the brainstem appear less conspicuous on previous examination and may have represented layering subarachnoid hemorrhage in the interpeduncular fossa  Extensive calvarial and facial fractures again identified  Hemorrhage and opacification of the paranasal sinuses also grossly unchanged  Workstation performed: [id number]         CT head wo contrast   Final Result by Svetlana Mcgarry DO (05/29 0829)      Increase in hemorrhagic contusions noted within the frontal lobes, right slightly greater than left  Bilateral extra-axial, likely subdural hemorrhages are again noted, right larger than left  Mild subarachnoid hemorrhage is stable or slightly improved  Possible small hemorrhages within the anterior aspect of the brainstem  Extensive calvarial and facial fractures again identified  Hemorrhage and opacification of the paranasal sinuses also grossly unchanged  Workstation performed: [id number]         XR chest portable   Final Result by Sherry Mason MD (05/29 0239)      Endotracheal tube in satisfactory position  Improved left basilar consolidation  Workstation performed: [id number]         TRAUMA - CT head wo contrast   Final Result by  (06/14 1600)   Addendum 1 of 1 by Mariia Mckoy MD (05/28 5886)   ADDENDUM:      Impression should also include   Small amount of blood noted at the interpedicular and suprasellar    cisterns  I personally discussed this addendum with Moses Luna 5/28/2019 at 6:06    PM          Final      TRAUMA - CT spine cervical wo contrast   Final Result by Mariia Mckoy MD (05/28 1600)      No cervical spine fracture or traumatic malalignment  I personally discussed this study  with Katie Carmona on 5/28/2019 at 3:44 PM              Workstation performed: [id number]         TRAUMA - CT chest abdomen pelvis w contrast   Final Result by Mariia Mckoy MD (05/28 7364)      1    No traumatic abnormality noted within the chest abdomen and pelvis   2  Left lower lobe subsegmental atelectasis   3  Soft tissue air in the left upper extremity      I personally discussed impression 1 with PAULINO Rice 5/28/2019 at 3:44 PM          Workstation performed: [id number]         CT facial bones wo contrast   Final Result by Chele Henderson MD (05/28 3397)      1  Scattered intracranial air, with focus of air adjacent to the cribriform plate  No fracture is identified, may represent occult fracture  2   Right LeFort III fracture   3  Right orbital fractures involve the lateral and inferior walls, and the superior orbital fissure   4  Right medial wall maxillary fracture   5  Left pterygoid plates are intact   6  Left orbital fractures involve the lateral and inferior walls, and the superior orbital fissure   7  Left temporal bone fractures involve the ossicles and carotid canal   8  Left medial maxillary wall fracture, and fractures of the left lateral and inferior sphenoid sinuses      I personally discussed this study  with Danny Brady 5/28/2019 at 4:29 PM          Workstation performed: [id number]         CTA head and neck w wo contrast   Final Result by Joie Lerma DO (05/28 4060)      Slight undulation of the cervical left ICA may represent a stretch injury  No carotid dissection or transection  Bilateral vertebral arteries are widely patent  No focal intrarenal stenosis or a result  Extensive multi compartmental intracranial hemorrhage with extensive skull fractures  I personally discussed this study with Dr Yves Stevenson on 5/28/2019 at 3:30 PM                      Workstation performed: [id number]         XR trauma multiple   Final Result by Bryan Churchill MD (05/28 7783)   Impression:   1  There is some atelectasis or infiltrate in the left lung base behind the heart     2   The tip of the endotracheal tube is in the right mainstem bronchus on this image, but has been repositioned into the trachea at the time of the follow-up chest CT which will be dictated under separate cover  3   Patient is skeletally immature  No fractures are seen  Workstation performed: [id number]         XR chest 1 view   Final Result by Keny Yates MD (05/30 1129)   Impression:   1  There is some atelectasis or infiltrate in the left lung base behind the heart  2   The tip of the endotracheal tube is in the right mainstem bronchus on this image, but has been repositioned into the trachea at the time of the follow-up chest CT which will be dictated under separate cover  3   Patient is skeletally immature  No fractures are seen  Workstation performed: [id number]         XR chest portable    (Results Pending)         Micro:  Blood Culture:   Lab Results   Component Value Date    BLOODCX No Growth After 5 Days  06/06/2019    BLOODCX No Growth After 5 Days  06/06/2019    BLOODCX Streptococcus pneumoniae (AA) 06/04/2019    BLOODCX Streptococcus pneumoniae (AA) 06/04/2019     Urine Culture: No results found for: URINECX  Sputum Culture: No components found for: SPUTUMCX  Wound Culure: No results found for: WOUNDCULT    Lab Results   Component Value Date    BLOODCX No Growth After 5 Days  06/06/2019    BLOODCX No Growth After 5 Days  06/06/2019    BLOODCX Streptococcus pneumoniae (AA) 06/04/2019    SPUTUMCULTUR 4+ Growth of Streptococcus pneumoniae (AA) 06/04/2019    SPUTUMCULTUR 3+ Growth of Pseudomonas aeruginosa (A) 06/04/2019    SPUTUMCULTUR 4+ Growth of Haemophilus influenzae (AA) 06/04/2019    SPUTUMCULTUR 2+ Growth of  06/04/2019     Allergies:    Allergies   Allergen Reactions    Other      bees     Medications:   Scheduled Meds:    Current Facility-Administered Medications:  acetaminophen 975 mg Oral Q8H [place] Fall, DMD    artificial tear  Both Eyes HS [place] Fall, DMD    ascorbic acid 250 mg Oral Daily [place] Fall, DMD    bisacodyl 10 mg Rectal Daily PRN Juliette Fall, DMD    bromocriptine 5 mg Oral Q8H Jeff Gutting, DMD    cefTRIAXone 2,000 mg Intravenous Q12H Dajuan Bocanegra MD Last Rate: Stopped (06/13/19 2002)   chlorhexidine 15 mL Swish & Spit Q12H [address], DMD    ciprofloxacin-dexamethasone 4 drop Left Ear BID Jeff Gutting, DMD    fentanyl citrate (PF) 100 mcg Intravenous Q2H PRN Jeff Gutting, DMD    heparin (porcine) 5,000 Units Subcutaneous [facility] Jeff Gutting, DMD    HYDROmorphone 1 mg Intravenous Q3H PRN Jeff Gutting, DMD    insulin lispro 2-12 Units Subcutaneous Q6H [facility] Jeff Gutting, DMD    ipratropium 0 5 mg Nebulization Q6H Jeff Gutting, DMD    levalbuterol 1 25 mg Nebulization Q6H Jeff Gutting, DMD    levETIRAcetam 2,000 mg Oral Q12H [facility] Marisela Carr PA-C    polyvinyl alcohol 1 drop Both Eyes PRN Jeff Gutting, DMD    potassium-sodium phosphates 2 packet Oral BID Pratima Vidal PA-C    propofol 5-60 mcg/kg/min Intravenous Titrated Jeff Gutting, DMD Last Rate: 40 mcg/kg/min (06/14/19 0510)   propranolol 10 mg Oral Q8H [address], DMD    sodium chloride 75 mL/hr Intravenous Continuous Matt Wilcox MD Last Rate: 75 mL/hr (06/14/19 3312)   sodium chloride (PF) 10 mL Intravenous PRN Jeff Gutting, DMD    sodium chloride (PF) 5 mL Intravenous PRN Boby Rubinstein, MD    IV infusion builder  Intravenous Continuous Omaira Ramon MD Last Rate: 300 mL/hr at 06/14/19 0636     Continuous Infusions:    propofol 5-60 mcg/kg/min Last Rate: 40 mcg/kg/min (06/14/19 0510)   sodium chloride 75 mL/hr Last Rate: 75 mL/hr (06/14/19 5253)   IV infusion builder  Last Rate: 300 mL/hr at 06/14/19 0636     PRN Meds:    bisacodyl 10 mg Daily PRN   fentanyl citrate (PF) 100 mcg Q2H PRN   HYDROmorphone 1 mg Q3H PRN   polyvinyl alcohol 1 drop PRN   sodium chloride (PF) 10 mL PRN   sodium chloride (PF) 5 mL PRN     VTE Pharmacologic Prophylaxis:   Pharmacologic: Heparin  Mechanical VTE Prophylaxis in Place: Yes    Invasive lines and devices:   Invasive Devices     Central Venous Catheter Line            CVC Central Lines 06/07/19 Triple 16cm 6 days          Peripheral Intravenous Line            Peripheral IV 06/13/19 Left Arm less than 1 day    Peripheral IV 06/13/19 Right Forearm less than 1 day          Drain            Urethral Catheter Temperature probe 16 days    Ventriculostomy/Subdural Ventricular drainage catheter Left Parietal region 10 days    Gastrostomy/Enterostomy Percutaneous endoscopic gastrostomy (PEG) 20 Fr  LUQ 3 days    Rectal Tube  less than 1 day          Airway            Surgical Airway Shiley Cuffed 3 days                   Code Status: Level 1 - Full Code      Kayla Gregory MD

## 2022-04-11 NOTE — NURSING NOTE
Prior to immunization administration, verified patients identity using patient s name and date of birth. Please see Immunization Activity for additional information.     Screening Questionnaire for Adult Immunization    Are you sick today?   No   Do you have allergies to medications, food, a vaccine component or latex?   No   Have you ever had a serious reaction after receiving a vaccination?   No   Do you have a long-term health problem with heart, lung, kidney, or metabolic disease (e.g., diabetes), asthma, a blood disorder, no spleen, complement component deficiency, a cochlear implant, or a spinal fluid leak?  Are you on long-term aspirin therapy?   No   Do you have cancer, leukemia, HIV/AIDS, or any other immune system problem?   No   Do you have a parent, brother, or sister with an immune system problem?   No   In the past 3 months, have you taken medications that affect  your immune system, such as prednisone, other steroids, or anticancer drugs; drugs for the treatment of rheumatoid arthritis, Crohn s disease, or psoriasis; or have you had radiation treatments?   No   Have you had a seizure, or a brain or other nervous system problem?   No   During the past year, have you received a transfusion of blood or blood    products, or been given immune (gamma) globulin or antiviral drug?   No   For women: Are you pregnant or is there a chance you could become       pregnant during the next month?   No   Have you received any vaccinations in the past 4 weeks?   No     Immunization questionnaire answers were all negative.        Per orders of Tricia Hernandez, injection of shingrix given by Sharron Jeffries CMA. Patient instructed to remain in clinic for 15 minutes afterwards, and to report any adverse reaction to me immediately.       Screening performed by Sharron Jeffries CMA on 4/11/2022 at 4:41 PM.

## 2022-04-11 NOTE — PATIENT INSTRUCTIONS
** To schedule stress test [phone]  ** To schedule imaging [phone]      Preventive Health Recommendations  Female Ages 50 - 64    Yearly exam: See your health care provider every year in order to  Review health changes.   Discuss preventive care.    Review your medicines if your doctor has prescribed any.    Get a Pap test every three years (unless you have an abnormal result and your provider advises testing more often).  If you get Pap tests with HPV test, you only need to test every 5 years, unless you have an abnormal result.   You do not need a Pap test if your uterus was removed (hysterectomy) and you have not had cancer.  You should be tested each year for STDs (sexually transmitted diseases) if you're at risk.   Have a mammogram every 1 to 2 years.  Have a colonoscopy at age 50, or have a yearly FIT test (stool test). These exams screen for colon cancer.    Have a cholesterol test every 5 years, or more often if advised.  Have a diabetes test (fasting glucose) every three years. If you are at risk for diabetes, you should have this test more often.   If you are at risk for osteoporosis (brittle bone disease), think about having a bone density scan (DEXA).    Shots: Get a flu shot each year. Get a tetanus shot every 10 years.    Nutrition:   Eat at least 5 servings of fruits and vegetables each day.  Eat whole-grain bread, whole-wheat pasta and brown rice instead of white grains and rice.  Get adequate Calcium and Vitamin D.     Lifestyle  Exercise at least 150 minutes a week (30 minutes a day, 5 days a week). This will help you control your weight and prevent disease.  Limit alcohol to one drink per day.  No smoking.   Wear sunscreen to prevent skin cancer.   See your dentist every six months for an exam and cleaning.  See your eye doctor every 1 to 2 years.

## 2022-04-12 DIAGNOSIS — I10 ESSENTIAL HYPERTENSION: ICD-10-CM

## 2022-04-12 LAB — RHEUMATOID FACT SER NEPH-ACNC: 36 IU/ML

## 2022-04-12 RX ORDER — IRBESARTAN AND HYDROCHLOROTHIAZIDE 150; 12.5 MG/1; MG/1
1 TABLET, FILM COATED ORAL DAILY
Qty: 90 TABLET | Refills: 0 | Status: SHIPPED | OUTPATIENT
Start: 2022-04-12 | End: 2022-07-16

## 2022-04-28 NOTE — PROGRESS NOTES
Rheumatology Clinic Visit  [facility]  Mine Albankarlaerica, PAC     Dot Annie Osborn MRN# [id number]   YOB: 1970 Age: 51 year old   Date of Visit: 04/28/2022  Primary care provider: Tricia Hernandez          Assessment and Plan:     1.  Polyarthralgia  2.  Primary osteoarthritis involving multiple joints  3.  Other fatigue  4.  Raynaud's disease    Patient was seen back in 2015 in rheumatology for a positive rheumatoid factor and joint pain.  She was not started on any medications at that time and overall things have been going well with the occasional episode of aches and pains.  Approximately 1 month ago she started to have more intense symptoms to where she was unable to twist a door handle due to the pain that she had in her joints.  She was given high-dose prednisone and states that it did help but she started to swell up from it.  Physical examination was remarkable for some osteoarthritic changes specifically of the CMC and knee joints.  Laboratory examination from 4/11/2022 was reviewed and showed a rheumatoid factor mildly elevated at 36, normal CRP and normal sed rate.  She did have an CHRISTINA back in 2001 which was negative, has not been checked since.    The patient has some symptoms suggestive of inflammatory arthritis in that she responded to prednisone as well as a mildly elevated rheumatoid factor.  However she does not have any palpable synovitis on examination and the quality of the pain that she is having does not specifically go with an inflammatory arthritis.  We also discussed that the dose of prednisone that she was given was quite high and therefore can help other types of joint pains as well.  Reviewed with the patient rechecking her CHRISTINA and the CCP antibodies.  I would like to recheck the CHRISTINA given the fatigue that she is having along with the joint symptoms and reports of Raynaud's symptoms.  Reviewed with patient that if this is positive that is a nonspecific therefore  more blood work would need to be drawn.  Should continue to conservatively manage the Raynaud's by keeping her hands warm.  We did discuss the possibility of starting medication for rheumatoid arthritis however she does have a surgery coming up and may want to wait until after that procedure.  We will also get x-rays of her hands to see if there is any inflammatory arthritic changes.    Plan:     1. Schedule follow-up with Mine Napoles PA-C in 1-2 months.   2. Imaging: xray hands  3. Labs: CCP antibodies, CHRISTINA   4. Medication recommendations: We will discuss this at our next visit    MUSTAPHA Leija  Rheumatology         History of Present Illness:   Dot Osborn presents for evaluation of rheumatoid arthritis.    She reports that she was diagnosed in 2015. She had joint pain and fingers and hands were bothering her. In 2014, she slipped on ice and broke her leg and had surgery. At that time, it was a question if it was a reaction to the metal. She was not given medication for her joint pains. Things had been going well with the exception of occasional aches and pains.     About 1 month ago, she started to have more intense symptoms. She states that the bones in her fingers would bother her. She had pain in her forearms. Her elbows, knees (R>L), and shoulders were more bothersome. She does not have a time of day that is more bothersome. Rest and activity does not make a difference for her symptoms. She swelled from Prednisone that was given to her, but no swelling in the joints. She states that the Prednisone helped with the pain. She was able to move her hands and twist handles. No morning stiffness. She has fatigue that has started in the last couple years. She states that she feels more achy. No fevers or frequent infections. No skin rashes or mouth sores. She states that the tips of her fingers turn purple when they are cold. It was painful/tender/numb. No dry eyes or dry mouth. No seizures,  miscarriages, blood clots or hair loss. No family history of RA, lupus, ulcerative colitis, crohn's. No personal history of psoriasis, ulcerative colitis or crohn's.     Per rheumatology note from November 12, 2015 her symptoms were not consistent with rheumatoid arthritis however given her age/serologies it could not be ruled out however she did not meet criteria for treatment.  At that time she had a low positive rheumatoid factor, negative CCP antibodies, CHRISTINA negative, and SSA negative.  Tickborne disease serologies were negative as well.  Her acute phase reactants were also normal.         Review of Systems:     Constitutional: As above  Skin: negative  Eyes: negative  Ears/Nose/Throat: negative  Respiratory: No shortness of breath, dyspnea on exertion, cough, or hemoptysis  Cardiovascular: negative  Gastrointestinal: negative  Genitourinary: negative  Musculoskeletal: As above  Neurologic: negative  Psychiatric: negative  Hematologic/Lymphatic/Immunologic: negative  Endocrine: negative         Active Problem List:     Patient Active Problem List    Diagnosis Date Noted     Palpitations 07/27/2020     Priority: Medium     Essential hypertension 07/27/2020     Priority: Medium     Hyperlipidemia LDL goal <100 07/27/2020     Priority: Medium            Past Medical History:     Past Medical History:   Diagnosis Date     Elevated BP without diagnosis of hypertension      Past Surgical History:   Procedure Laterality Date     HYSTERECTOMY, PAP NO LONGER INDICATED       XR ANKLE SURGERY MARY LEFT Right             Social History:     Social History     Socioeconomic History     Marital status:      Spouse name: Not on file     Number of children: Not on file     Years of education: Not on file     Highest education level: Not on file   Occupational History     Not on file   Tobacco Use     Smoking status: Former Smoker     Types: Cigarettes     Smokeless tobacco: Never Used     Tobacco comment: rarely smokes,  10/month   Substance and Sexual Activity     Alcohol use: Yes     Comment: occasionally     Drug use: Not on file     Sexual activity: Not on file   Other Topics Concern     Parent/sibling w/ CABG, MI or angioplasty before 65F 55M? Not Asked   Social History Narrative     Not on file     Social Determinants of Health     Financial Resource Strain: Not on file   Food Insecurity: Not on file   Transportation Needs: Not on file   Physical Activity: Not on file   Stress: Not on file   Social Connections: Not on file   Intimate Partner Violence: Not on file   Housing Stability: Not on file          Family History:     Family History   Problem Relation Age of Onset     Coronary Artery Disease Maternal Grandmother             Allergies:     Allergies   Allergen Reactions     Lisinopril      cough     Sulfa Drugs Difficulty breathing, Nausea and Swelling            Medications:     Current Outpatient Medications   Medication Sig Dispense Refill     Cetirizine HCl (ZYRTEC ALLERGY PO)        fluticasone (FLONASE) 50 MCG/ACT nasal spray Spray 1 spray into both nostrils daily       irbesartan-hydrochlorothiazide (AVALIDE) 150-12.5 MG tablet Take 1 tablet by mouth in the morning. Please call for an appointment. [phone] 90 tablet 0     multivitamin w/minerals (THERA-VIT-M) tablet Take 1 tablet by mouth daily       omega 3 1000 MG CAPS        predniSONE (DELTASONE) 20 MG tablet Take 3 tabs by mouth daily x 3 days, then 2 tabs daily x 3 days, then 1 tab daily x 3 days, then 1/2 tab daily x 3 days. 20 tablet 0     traZODone (DESYREL) 50 MG tablet Take 1-4 tablets ( mg) by mouth At Bedtime 90 tablet 1            Physical Exam:   There were no vitals taken for this visit.  Wt Readings from Last 6 Encounters:   04/11/22 71.7 kg (158 lb)   02/26/21 69.4 kg (153 lb)   07/27/20 66.4 kg (146 lb 6.4 oz)     Constitutional: well-developed, appearing stated age; cooperative  Eyes: nl EOM, PERRLA, vision, conjunctiva, sclera  ENT:  nl external ears, nose, hearing, lips, teeth, gums, throat. No mucositis.   No mucous membrane lesions, normal saliva pool  Neck: no mass or thyroid enlargement  Resp: lungs clear to auscultation, nl to palpation  CV: RRR, no murmurs, rubs or gallops, no edema  Lymph: no cervical, supraclavicular or epitrochlear nodes  MS: The  shoulder, elbow, wrist, MCP/PIP/DIP, spine, hip, knee, ankle, and foot MTP/IP joints were examined and found normal. No active synovitis or altered joint anatomy. Full joint ROM. No dactylitis,  tenosynovitis, enthesopathy.  Crepitus upon range of motion of the bilateral knees.  Some tenderness at the bilateral CMC joints with range of motion.  Skin: no nail pitting, alopecia, rash, nodules or lesions.   Neuro: nl cranial nerves.   Psych: nl judgement, orientation, memory, affect.           Data:   Imaging:  n/a    Laboratory:  4/11/22  Creatinine 0.80, GFR 88  Calcium 8.3  CRP less than 2.9  Rheumatoid factor 36  Sed rate 9    5/23/2001  CHRISTINA negative    11/6/2015  SSA 3.1, SSB 0.56  CCP antibody <15.6  RF 26

## 2022-05-02 ENCOUNTER — OFFICE VISIT (OUTPATIENT)
Dept: RHEUMATOLOGY | Facility: CLINIC | Age: 52
End: 2022-05-02
Payer: COMMERCIAL

## 2022-05-02 ENCOUNTER — ANCILLARY PROCEDURE (OUTPATIENT)
Dept: GENERAL RADIOLOGY | Facility: CLINIC | Age: 52
End: 2022-05-02
Attending: PHYSICIAN ASSISTANT
Payer: COMMERCIAL

## 2022-05-02 VITALS
HEIGHT: 66 IN | DIASTOLIC BLOOD PRESSURE: 60 MMHG | WEIGHT: 158 LBS | SYSTOLIC BLOOD PRESSURE: 102 MMHG | BODY MASS INDEX: 25.39 KG/M2

## 2022-05-02 DIAGNOSIS — R53.83 OTHER FATIGUE: ICD-10-CM

## 2022-05-02 DIAGNOSIS — M25.50 POLYARTHRALGIA: Primary | ICD-10-CM

## 2022-05-02 DIAGNOSIS — M25.50 POLYARTHRALGIA: ICD-10-CM

## 2022-05-02 DIAGNOSIS — M15.0 PRIMARY OSTEOARTHRITIS INVOLVING MULTIPLE JOINTS: ICD-10-CM

## 2022-05-02 DIAGNOSIS — I73.00 RAYNAUD'S DISEASE WITHOUT GANGRENE: ICD-10-CM

## 2022-05-02 PROCEDURE — 86200 CCP ANTIBODY: CPT | Performed by: PHYSICIAN ASSISTANT

## 2022-05-02 PROCEDURE — 73130 X-RAY EXAM OF HAND: CPT | Mod: TC | Performed by: RADIOLOGY

## 2022-05-02 PROCEDURE — 86038 ANTINUCLEAR ANTIBODIES: CPT | Performed by: PHYSICIAN ASSISTANT

## 2022-05-02 PROCEDURE — 36415 COLL VENOUS BLD VENIPUNCTURE: CPT | Performed by: PHYSICIAN ASSISTANT

## 2022-05-02 PROCEDURE — 99204 OFFICE O/P NEW MOD 45 MIN: CPT | Performed by: PHYSICIAN ASSISTANT

## 2022-05-02 NOTE — PATIENT INSTRUCTIONS
After Visit Instructions:     Thank you for coming to [facility] for your care. It is my goal to partner with you to help you reach your optimal state of health.       Plan:     Schedule follow-up with Mine Napoles PA-C in 1-2 months.   Imaging: xray hands  Labs: CCP antibodies, CHRISTINA   Medication recommendations: We will discuss this at our next visit      Mine Napoles PA-C  [facility]  [facility]    Contact information: [facility]  Clinic Number:  [phone]  Please call or send a [employer] message with any questions about your care

## 2022-05-03 LAB
ANA SER QL IF: NEGATIVE
CCP AB SER IA-ACNC: 0.9 U/ML

## 2022-06-03 DIAGNOSIS — G47.00 INSOMNIA, UNSPECIFIED TYPE: ICD-10-CM

## 2022-06-07 RX ORDER — TRAZODONE HYDROCHLORIDE 50 MG/1
TABLET, FILM COATED ORAL
Qty: 90 TABLET | Refills: 1 | Status: SHIPPED | OUTPATIENT
Start: 2022-06-07 | End: 2022-07-26

## 2022-07-11 ENCOUNTER — NURSE TRIAGE (OUTPATIENT)
Dept: NURSING | Facility: CLINIC | Age: 52
End: 2022-07-11

## 2022-07-11 ENCOUNTER — E-VISIT (OUTPATIENT)
Dept: URGENT CARE | Facility: CLINIC | Age: 52
End: 2022-07-11
Payer: COMMERCIAL

## 2022-07-11 ENCOUNTER — OFFICE VISIT (OUTPATIENT)
Dept: URGENT CARE | Facility: URGENT CARE | Age: 52
End: 2022-07-11
Payer: COMMERCIAL

## 2022-07-11 VITALS
BODY MASS INDEX: 25.31 KG/M2 | SYSTOLIC BLOOD PRESSURE: 143 MMHG | DIASTOLIC BLOOD PRESSURE: 82 MMHG | WEIGHT: 156.8 LBS | OXYGEN SATURATION: 99 % | HEART RATE: 77 BPM | TEMPERATURE: 97.9 F

## 2022-07-11 DIAGNOSIS — R06.2 WHEEZING: Primary | ICD-10-CM

## 2022-07-11 DIAGNOSIS — J01.10 ACUTE NON-RECURRENT FRONTAL SINUSITIS: Primary | ICD-10-CM

## 2022-07-11 PROCEDURE — 99213 OFFICE O/P EST LOW 20 MIN: CPT | Performed by: PHYSICIAN ASSISTANT

## 2022-07-11 PROCEDURE — 99207 PR NON-BILLABLE SERV PER CHARTING: CPT | Performed by: FAMILY MEDICINE

## 2022-07-11 RX ORDER — ALBUTEROL SULFATE 90 UG/1
2 AEROSOL, METERED RESPIRATORY (INHALATION) EVERY 6 HOURS
Qty: 18 G | Refills: 0 | Status: SHIPPED | OUTPATIENT
Start: 2022-07-11

## 2022-07-11 NOTE — TELEPHONE ENCOUNTER
Sick with sinus pain x 10 and now in chest.    Harder to breathe.  Ashtma.    Finished zpak for sinus infection but not better.    Needs to be seen today in clinic or UC per protocol.    Going to do an evisit. Schedule is pushed out till August.    Preeti Estrada RN  [facility] Nurse Advisor      Reason for Disposition    Wheezing (purring or whistling sound) occurs    Additional Information    Negative: Severe difficulty breathing (struggling for each breath, unable to speak or cry because of difficulty breathing, making grunting noises with each breath)    Negative: Slow, shallow weak breathing    Negative: Bluish (or gray) lips or face now    Negative: Sounds like a life-threatening emergency to the triager    Negative: Runny nose is caused by pollen or other allergies    Negative: Wheezing is present    Negative: Cough is the main symptom    Negative: Sore throat is the main symptom    Negative: Not alert when awake (true lethargy)    Negative: Ribs are pulling in with each breath (retractions)    Negative: Age < 12 weeks with fever 100.4 F (38.0 C) or higher rectally    Negative: Difficulty breathing, but not severe    Negative: Fever and weak immune system (sickle cell disease, HIV, chemotherapy, organ transplant, chronic steroids, etc)    Negative: High-risk child (e.g., underlying severe lung disease such as CF or trach)    Negative: Lips or face have turned bluish, but not present now    Negative: Child sounds very sick or weak to the triager    Protocols used: COLDS-P-OH

## 2022-07-11 NOTE — PROGRESS NOTES
SUBJECTIVE:   Dot Osborn is a 52 year old female who complains of nasal congestion, headache, facial pressure, moderate sore throat and cough for 12 days. She denies a history of shortness of breath and dizzyness and admits to a history of asthma. Patient denies smoke cigarettes. She tried a zpack and that did not work     OBJECTIVE:    BP (!) 143/82   Pulse 77   Temp 97.9  F (36.6  C) (Tympanic)   Wt 71.1 kg (156 lb 12.8 oz)   SpO2 99%   BMI 25.31 kg/m      She appears well, vital signs are as noted by the nurse. Ears normal.  Throat and pharynx normal.  Neck supple. No adenopathy in the neck. Nose is congested. Sinuses non tender. The chest is clear, without wheezes or rales.CVS exam: S1, S2 normal, no murmur, click, rub or gallop, regular rate and rhythm.     ASSESSMENT:    Diagnosis Comments   1. Acute non-recurrent frontal sinusitis  amoxicillin-clavulanate (AUGMENTIN) 875-125 MG tablet, albuterol (PROAIR HFA/PROVENTIL HFA/VENTOLIN HFA) 108 (90 Base) MCG/ACT inhaler          PLAN:  Symptomatic therapy suggested: push fluids and rest. Call or return to clinic prn if these symptoms worsen or fail to improve as anticipated.

## 2022-07-11 NOTE — PATIENT INSTRUCTIONS
Dear Dot Osborn,    We are sorry you are not feeling well. Based on the responses you provided, it is recommended that you be seen in-person in urgent care so we can better evaluate your symptoms. Please click here to find the nearest urgent care location to you.   You will not be charged for this Visit. Thank you for trusting us with your care.    Kasia Hull MD

## 2022-07-13 DIAGNOSIS — I10 ESSENTIAL HYPERTENSION: ICD-10-CM

## 2022-07-16 RX ORDER — IRBESARTAN AND HYDROCHLOROTHIAZIDE 150; 12.5 MG/1; MG/1
1 TABLET, FILM COATED ORAL DAILY
Qty: 90 TABLET | Refills: 0 | Status: SHIPPED | OUTPATIENT
Start: 2022-07-16 | End: 2022-10-28

## 2022-08-04 ENCOUNTER — HOSPITAL ENCOUNTER (OUTPATIENT)
Dept: MAMMOGRAPHY | Facility: CLINIC | Age: 52
Discharge: HOME | End: 2022-08-04
Attending: PHYSICIAN ASSISTANT | Admitting: PHYSICIAN ASSISTANT

## 2022-08-04 DIAGNOSIS — Z12.31 VISIT FOR SCREENING MAMMOGRAM: ICD-10-CM

## 2022-08-04 PROCEDURE — 77067 SCR MAMMO BI INCL CAD: CPT

## 2022-10-09 ENCOUNTER — HEALTH MAINTENANCE LETTER (OUTPATIENT)
Age: 52
End: 2022-10-09

## 2022-10-12 ENCOUNTER — OFFICE VISIT (OUTPATIENT)
Dept: FAMILY MEDICINE | Facility: CLINIC | Age: 52
End: 2022-10-12
Payer: COMMERCIAL

## 2022-10-12 VITALS
RESPIRATION RATE: 16 BRPM | BODY MASS INDEX: 26.7 KG/M2 | SYSTOLIC BLOOD PRESSURE: 126 MMHG | DIASTOLIC BLOOD PRESSURE: 80 MMHG | HEART RATE: 83 BPM | OXYGEN SATURATION: 99 % | TEMPERATURE: 97.7 F | WEIGHT: 165.4 LBS

## 2022-10-12 DIAGNOSIS — N95.1 MENOPAUSAL SYNDROME (HOT FLASHES): Primary | ICD-10-CM

## 2022-10-12 DIAGNOSIS — J01.90 ACUTE SINUSITIS WITH SYMPTOMS > 10 DAYS: ICD-10-CM

## 2022-10-12 PROCEDURE — 99214 OFFICE O/P EST MOD 30 MIN: CPT | Performed by: PHYSICIAN ASSISTANT

## 2022-10-12 RX ORDER — ESTRADIOL 0.05 MG/D
1 PATCH, EXTENDED RELEASE TRANSDERMAL
Qty: 24 PATCH | Refills: 3 | Status: SHIPPED | OUTPATIENT
Start: 2022-10-13 | End: 2023-06-01

## 2022-10-12 RX ORDER — DOXYCYCLINE 100 MG/1
100 CAPSULE ORAL 2 TIMES DAILY
Qty: 20 CAPSULE | Refills: 0 | Status: SHIPPED | OUTPATIENT
Start: 2022-10-12 | End: 2022-10-22

## 2022-10-12 ASSESSMENT — PAIN SCALES - GENERAL: PAINLEVEL: SEVERE PAIN (6)

## 2022-10-12 NOTE — PROGRESS NOTES
Ghislaine Valencia is a 52 year old, presenting for the following health issues:  Discuss hormones and Sinus Problem      Sinus Problem     History of Present Illness       Reason for visit:  Two reasons one is i have been stuffy headache sore coigh for over one month and hormone discussion  Symptom onset:  More than a month  Symptoms include:  Weight gain exhaustion low libido body joint acheshair changing anxiety  Symptom intensity:  Severe  Symptom progression:  Worsening  Had these symptoms before:  Yes  Has tried/received treatment for these symptoms:  No  What makes it worse:  No  What makes it better:  No    She eats 2-3 servings of fruits and vegetables daily.She consumes 0 sweetened beverage(s) daily.She exercises with enough effort to increase her heart rate 30 to 60 minutes per day.  She exercises with enough effort to increase her heart rate 4 days per week.   She is taking medications regularly.    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 Symptoms will get better and then worse again. It now feels like everything has settled in her sinuses. She reports facial pressure, congestion, fatigue. Drainage down her throat and now her voice is hoarse. Intermittent cough. No fevers. She does have history of sinus infections and this feels similar.     She is also wishing to discuss HRT. She had a hysterectomy 19 year ago. Just in the last several months symptoms really started to worsen with fatigue, mood swings, weight gain, low libido and hot flashes. She has tried more conservative treatment options without any relief. She has been good about routine mammogram screening.     Review of Systems   Constitutional, HEENT, cardiovascular, pulmonary, GI, , musculoskeletal, neuro, skin, endocrine and psych systems are negative, except as otherwise noted.      Objective    /80   Pulse 83   Temp 97.7  F (36.5  C) (Temporal)   Resp 16   Wt 75 kg (165 lb 6.4 oz)    SpO2 99%   BMI 26.70 kg/m    Body mass index is 26.7 kg/m .  Physical Exam   GENERAL: healthy, alert and no distress  HENT: normal cephalic/atraumatic, ear canals and TM's normal, nose and mouth without ulcers or lesions, oropharynx clear, oral mucous membranes moist and sinuses: maxillary tenderness on both sides, maxillary swelling on both sides  NECK: no adenopathy, no asymmetry, masses, or scars and thyroid normal to palpation  RESP: lungs clear to auscultation - no rales, rhonchi or wheezes  CV: regular rate and rhythm, normal S1 S2, no S3 or S4, no murmur, click or rub, no peripheral edema and peripheral pulses strong  SKIN: no suspicious lesions or rashes  PSYCH: mentation appears normal, affect normal/bright        Assessment & Plan     Menopausal syndrome (hot flashes)  Discussed HRT in detail today including options and risks/benefits associated. Elected to start a patch as below. Dose adjustment and or change to oral Estrace pending patient's symptoms going forward. She will continue to monitor her blood pressure as well as routine mammogram screening.   - estradiol (VIVELLE-DOT) 0.05 MG/24HR bi-weekly patch; Place 1 patch onto the skin twice a week    Acute sinusitis with symptoms > 10 days  Antibiotics started as below. Encouraged continuation of supportive cares with rest, fluids and tylenol/ibuprofen as needed. Patient will follow-up in clinic if new symptoms develop or current symptoms fail to improve.   - doxycycline hyclate (VIBRAMYCIN) 100 MG capsule; Take 1 capsule (100 mg) by mouth 2 times daily for 10 days    The patient indicates understanding of these issues and agrees with the plan.    Tricia Hernandez PA-C  [facility]

## 2022-10-26 DIAGNOSIS — I10 ESSENTIAL HYPERTENSION: ICD-10-CM

## 2022-10-28 RX ORDER — IRBESARTAN AND HYDROCHLOROTHIAZIDE 150; 12.5 MG/1; MG/1
TABLET, FILM COATED ORAL
Qty: 90 TABLET | Refills: 0 | Status: SHIPPED | OUTPATIENT
Start: 2022-10-28 | End: 2023-01-09

## 2022-12-14 ENCOUNTER — TELEPHONE (OUTPATIENT)
Dept: CARDIOLOGY | Facility: CLINIC | Age: 52
End: 2022-12-14

## 2022-12-14 NOTE — TELEPHONE ENCOUNTER
"Returned Pt call reviewed with her that testing recommended was nearly a year ago and provider who recommended no longer with facility, and Dr Ibarra has not seen her in over 2 years. Pt denies any symptoms of any type presently she states \"I just wanted to do testing that was recommended\". Informed her she can get a Ct calcium score any time , but that is in [place] and does not need order to do. Discussed that it would probable be best to be seen for further treatment or stress testing. Gave her scheduling number. MAGALI Avilez RN  "

## 2022-12-14 NOTE — TELEPHONE ENCOUNTER
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  [facility]

## 2022-12-16 ENCOUNTER — MYC MEDICAL ADVICE (OUTPATIENT)
Dept: CARDIOLOGY | Facility: CLINIC | Age: 52
End: 2022-12-16

## 2022-12-16 NOTE — TELEPHONE ENCOUNTER
Sent patient [employer] message as she was called recently. Gave phone number to schedule CT Calcium exam.     Agnieszka Garcia CMA ([facility])

## 2023-01-09 ENCOUNTER — OFFICE VISIT (OUTPATIENT)
Dept: CARDIOLOGY | Facility: CLINIC | Age: 53
End: 2023-01-09
Payer: COMMERCIAL

## 2023-01-09 VITALS
SYSTOLIC BLOOD PRESSURE: 132 MMHG | WEIGHT: 159.4 LBS | HEART RATE: 71 BPM | OXYGEN SATURATION: 99 % | HEIGHT: 66 IN | DIASTOLIC BLOOD PRESSURE: 76 MMHG | BODY MASS INDEX: 25.62 KG/M2

## 2023-01-09 DIAGNOSIS — I10 ESSENTIAL HYPERTENSION: ICD-10-CM

## 2023-01-09 PROCEDURE — 99214 OFFICE O/P EST MOD 30 MIN: CPT | Performed by: INTERNAL MEDICINE

## 2023-01-09 RX ORDER — IRBESARTAN AND HYDROCHLOROTHIAZIDE 150; 12.5 MG/1; MG/1
1 TABLET, FILM COATED ORAL DAILY
Qty: 90 TABLET | Refills: 0 | Status: SHIPPED | OUTPATIENT
Start: 2023-01-09 | End: 2023-01-09

## 2023-01-09 RX ORDER — IRBESARTAN AND HYDROCHLOROTHIAZIDE 150; 12.5 MG/1; MG/1
1 TABLET, FILM COATED ORAL DAILY
Qty: 90 TABLET | Refills: 3 | Status: SHIPPED | OUTPATIENT
Start: 2023-01-09 | End: 2024-04-02

## 2023-01-09 NOTE — LETTER
1/9/2023    Tricia Hernandez PA-C  [address] [place] [address]    RE: Dot Osborn       Dear Colleague,     I had the pleasure of seeing Dot Osborn in the [facility].  HISTORY:    Dot Osborn is a very pleasant and healthy 52-year-old female who was first seen in cardiology clinic in July 2020 for hypertension.  She also at the time had noticed some occasional palpitations.  Her lipid profile on presentation was minimally elevated with a total cholesterol of 231 but with an HDL of 95 and an LDL of 103.  She is here today for a routine annual follow-up visit, having recently moved to the [facility].    Without treatment the most recent cholesterol value done in April 2022 showed a cholesterol of 186 with an HDL of 71 and an LDL of just 52 with moderate triglyceride elevation at 313.    Dot reports that she has been doing well and has no cardiovascular concerns.  She is no longer aware of any sense of palpitations and does not have exertional chest pain, PND/orthopnea, syncope or near syncope, strokelike symptoms, peripheral edema, or claudication.  Her energy and stamina are normal.  She has been using her Avalide 150-12.5 on a regular basis and her blood pressure is well controlled.    A calcium scoring exam was suggested in the past when her cholesterol had been mildly elevated and she has just not gotten around to it yet.      ASSESSMENT/PLAN:    1.  Hypertension well-controlled on current medications, continue same, likely indefinitely.  2.  Hyperlipidemia.  The cholesterol has come down nicely although Annie reports that she has not changed her diet significantly.  I cannot explain the substantial difference between her lipid measurements in November 2019 and again in April 2022.  A calcium scoring exam would be helpful to further risk stratify, and this was discussed with her and she is interested in having it done.    Thank you for inviting me to  participate in the care of your patient.  Please don't hesitate to call if I can be of further assistance.  25 minutes were spent today reviewing the chart and other records, interviewing and examining the patient, and documenting our visit.  Annie really has no ongoing problems requiring cardiology involvement.  We will contact her with the results of the calcium scoring exam when available and if there are any abnormalities on that study we will pursue, otherwise I would plan on seeing her as needed in the future.    Chart documentation was completed, in part, with [employer] voice-recognition software. Even though reviewed, some grammatical, spelling, and word errors may remain.       No orders of the defined types were placed in this encounter.    Orders Placed This Encounter   Medications     DISCONTD: irbesartan-hydrochlorothiazide (AVALIDE) 150-12.5 MG tablet     Sig: Take 1 tablet by mouth daily     Dispense:  90 tablet     Refill:  0     irbesartan-hydrochlorothiazide (AVALIDE) 150-12.5 MG tablet     Sig: Take 1 tablet by mouth daily     Dispense:  90 tablet     Refill:  3     Medications Discontinued During This Encounter   Medication Reason     irbesartan-hydrochlorothiazide (AVALIDE) 150-12.5 MG tablet Reorder     irbesartan-hydrochlorothiazide (AVALIDE) 150-12.5 MG tablet Reorder       10 year ASCVD risk: The 10-year ASCVD risk score (Celina QUIROGA, et al., 2019) is: 1.8%    Values used to calculate the score:      Age: 52 years      Sex: Female      Is Non- : No      Diabetic: No      Tobacco smoker: No      Systolic Blood Pressure: 132 mmHg      Is BP treated: Yes      HDL Cholesterol: 71 mg/dL      Total Cholesterol: 186 mg/dL    Encounter Diagnosis   Name Primary?     Essential hypertension        CURRENT MEDICATIONS:  Current Outpatient Medications   Medication Sig Dispense Refill     albuterol (PROAIR HFA/PROVENTIL HFA/VENTOLIN HFA) 108 (90 Base) MCG/ACT inhaler Inhale 2 puffs  into the lungs every 6 hours 18 g 0     Cetirizine HCl (ZYRTEC ALLERGY PO)        estradiol (VIVELLE-DOT) 0.05 MG/24HR bi-weekly patch Place 1 patch onto the skin twice a week 24 patch 3     fluticasone (FLONASE) 50 MCG/ACT nasal spray Spray 1 spray into both nostrils daily       irbesartan-hydrochlorothiazide (AVALIDE) 150-12.5 MG tablet Take 1 tablet by mouth daily 90 tablet 3     multivitamin w/minerals (THERA-VIT-M) tablet Take 1 tablet by mouth daily       omega 3 1000 MG CAPS        traZODone (DESYREL) 50 MG tablet TAKE 1 TO 4 TABLETS BY MOUTH AT BEDTIME 90 tablet 1       ALLERGIES     Allergies   Allergen Reactions     Lisinopril      cough     Sulfa Drugs Difficulty breathing, Nausea and Swelling       PAST MEDICAL HISTORY:  Past Medical History:   Diagnosis Date     Elevated BP without diagnosis of hypertension        PAST SURGICAL HISTORY:  Past Surgical History:   Procedure Laterality Date     HYSTERECTOMY, PAP NO LONGER INDICATED       XR ANKLE SURGERY MARY LEFT Right        FAMILY HISTORY:  Family History   Problem Relation Age of Onset     Coronary Artery Disease Maternal Grandmother        SOCIAL HISTORY:  Social History     Socioeconomic History     Marital status:      Spouse name: None     Number of children: None     Years of education: None     Highest education level: None   Tobacco Use     Smoking status: Former     Types: Cigarettes     Smokeless tobacco: Never     Tobacco comments:     rarely smokes, 10/month   Vaping Use     Vaping Use: Never used   Substance and Sexual Activity     Alcohol use: Yes     Comment: occasionally     Drug use: Never     Sexual activity: Yes     Partners: Male       Review of Systems:  Skin:        Eyes:       ENT:       Respiratory:  Positive for dyspnea on exertion;shortness of breath  Cardiovascular:  Negative;chest pain;lightheadedness;dizziness;edema Positive for;palpitations  Gastroenterology:      Genitourinary:       Musculoskeletal:      "  Neurologic:  Positive for numbness or tingling of hands  Psychiatric:       Heme/Lymph/Imm:  Positive for allergies  Endocrine:         Physical Exam:  Vitals: /76 (BP Location: Left arm, Patient Position: Sitting, Cuff Size: Adult Regular)   Pulse 71   Ht 1.676 m (5' 6\")   Wt 72.3 kg (159 lb 6.4 oz)   SpO2 99%   BMI 25.73 kg/m      Constitutional:  cooperative, alert and oriented, well developed, well nourished, in no acute distress   Fit in appearance    Skin:  warm and dry to the touch        Head:  normocephalic        Eyes:  pupils equal and round, conjunctivae and lids unremarkable, sclera white, no xanthalasma, EOMS intact, no nystagmus        ENT:  no pallor or cyanosis        Neck:  carotid pulses are full and equal bilaterally, JVP normal, no carotid bruit        Chest:  normal breath sounds, clear to auscultation, normal A-P diameter, normal symmetry, normal respiratory excursion, no use of accessory muscles        Cardiac: regular rhythm, normal S1/S2, no S3 or S4, apical impulse not displaced, no murmurs, gallops or rubs                  Abdomen:  abdomen soft;BS normoactive        Vascular: pulses full and equal                                      Extremities and Muscular Skeletal:  no edema           Neurological:  no gross motor deficits        Psych:  affect appropriate, oriented to time, person and place     Recent Lab Results:  LIPID RESULTS:  Lab Results   Component Value Date    CHOL 186 04/11/2022    CHOL 231 (H) 11/05/2019    HDL 71 04/11/2022    HDL 95 11/05/2019    LDL 52 04/11/2022     (H) 11/05/2019    TRIG 313 (H) 04/11/2022    TRIG 166 (H) 11/05/2019    CHOLHDLRATIO 2.3 07/06/2010       LIVER ENZYME RESULTS:  Lab Results   Component Value Date    AST 22 07/06/2010       CBC RESULTS:  Lab Results   Component Value Date    WBC 8.7 04/02/2002    RBC 4.12 (L) 04/02/2002    HGB 13.3 04/02/2002    HCT 39.8 04/02/2002    MCV 97 04/02/2002    RDW 12.7 04/02/2002     " 04/02/2002       BMP RESULTS:  Lab Results   Component Value Date     04/11/2022     02/16/2021    POTASSIUM 3.8 04/11/2022    POTASSIUM 3.9 02/16/2021    CHLORIDE 107 04/11/2022    CHLORIDE 99 02/16/2021    CO2 30 04/11/2022    CO2 28 07/27/2020    ANIONGAP 3 04/11/2022    ANIONGAP 11.1 07/27/2020     (H) 04/11/2022     (A) 02/16/2021    BUN 21 04/11/2022    BUN 16 02/16/2021    CR 0.80 04/11/2022    CR 0.97 (A) 02/16/2021    GFRESTIMATED 88 04/11/2022    GFRESTIMATED >60 02/16/2021    GFRESTBLACK 81 07/27/2020    CARRIE 8.3 (L) 04/11/2022    CARRIE 9.5 02/16/2021        A1C RESULTS:  No results found for: A1C    INR RESULTS:  No results found for: INR      Ac Lombardo MD, [facility]    CC  No referring provider defined for this encounter.    Thank you for allowing me to participate in the care of your patient.      Sincerely,     Ac Lombardo MD     [facility]

## 2023-01-09 NOTE — PROGRESS NOTES
HISTORY:    Dot Osborn is a very pleasant and healthy 52-year-old female who was first seen in cardiology clinic in July 2020 for hypertension.  She also at the time had noticed some occasional palpitations.  Her lipid profile on presentation was minimally elevated with a total cholesterol of 231 but with an HDL of 95 and an LDL of 103.  She is here today for a routine annual follow-up visit, having recently moved to the [facility].    Without treatment the most recent cholesterol value done in April 2022 showed a cholesterol of 186 with an HDL of 71 and an LDL of just 52 with moderate triglyceride elevation at 313.    Dot reports that she has been doing well and has no cardiovascular concerns.  She is no longer aware of any sense of palpitations and does not have exertional chest pain, PND/orthopnea, syncope or near syncope, strokelike symptoms, peripheral edema, or claudication.  Her energy and stamina are normal.  She has been using her Avalide 150-12.5 on a regular basis and her blood pressure is well controlled.    A calcium scoring exam was suggested in the past when her cholesterol had been mildly elevated and she has just not gotten around to it yet.      ASSESSMENT/PLAN:    1.  Hypertension well-controlled on current medications, continue same, likely indefinitely.  2.  Hyperlipidemia.  The cholesterol has come down nicely although Annie reports that she has not changed her diet significantly.  I cannot explain the substantial difference between her lipid measurements in November 2019 and again in April 2022.  A calcium scoring exam would be helpful to further risk stratify, and this was discussed with her and she is interested in having it done.    Thank you for inviting me to participate in the care of your patient.  Please don't hesitate to call if I can be of further assistance.  25 minutes were spent today reviewing the chart and other records, interviewing and examining the patient,  and documenting our visit.  Annie really has no ongoing problems requiring cardiology involvement.  We will contact her with the results of the calcium scoring exam when available and if there are any abnormalities on that study we will pursue, otherwise I would plan on seeing her as needed in the future.    Chart documentation was completed, in part, with [employer] voice-recognition software. Even though reviewed, some grammatical, spelling, and word errors may remain.       No orders of the defined types were placed in this encounter.    Orders Placed This Encounter   Medications     DISCONTD: irbesartan-hydrochlorothiazide (AVALIDE) 150-12.5 MG tablet     Sig: Take 1 tablet by mouth daily     Dispense:  90 tablet     Refill:  0     irbesartan-hydrochlorothiazide (AVALIDE) 150-12.5 MG tablet     Sig: Take 1 tablet by mouth daily     Dispense:  90 tablet     Refill:  3     Medications Discontinued During This Encounter   Medication Reason     irbesartan-hydrochlorothiazide (AVALIDE) 150-12.5 MG tablet Reorder     irbesartan-hydrochlorothiazide (AVALIDE) 150-12.5 MG tablet Reorder       10 year ASCVD risk: The 10-year ASCVD risk score (Celina QUIROGA, et al., 2019) is: 1.8%    Values used to calculate the score:      Age: 52 years      Sex: Female      Is Non- : No      Diabetic: No      Tobacco smoker: No      Systolic Blood Pressure: 132 mmHg      Is BP treated: Yes      HDL Cholesterol: 71 mg/dL      Total Cholesterol: 186 mg/dL    Encounter Diagnosis   Name Primary?     Essential hypertension        CURRENT MEDICATIONS:  Current Outpatient Medications   Medication Sig Dispense Refill     albuterol (PROAIR HFA/PROVENTIL HFA/VENTOLIN HFA) 108 (90 Base) MCG/ACT inhaler Inhale 2 puffs into the lungs every 6 hours 18 g 0     Cetirizine HCl (ZYRTEC ALLERGY PO)        estradiol (VIVELLE-DOT) 0.05 MG/24HR bi-weekly patch Place 1 patch onto the skin twice a week 24 patch 3     fluticasone (FLONASE) 50  MCG/ACT nasal spray Spray 1 spray into both nostrils daily       irbesartan-hydrochlorothiazide (AVALIDE) 150-12.5 MG tablet Take 1 tablet by mouth daily 90 tablet 3     multivitamin w/minerals (THERA-VIT-M) tablet Take 1 tablet by mouth daily       omega 3 1000 MG CAPS        traZODone (DESYREL) 50 MG tablet TAKE 1 TO 4 TABLETS BY MOUTH AT BEDTIME 90 tablet 1       ALLERGIES     Allergies   Allergen Reactions     Lisinopril      cough     Sulfa Drugs Difficulty breathing, Nausea and Swelling       PAST MEDICAL HISTORY:  Past Medical History:   Diagnosis Date     Elevated BP without diagnosis of hypertension        PAST SURGICAL HISTORY:  Past Surgical History:   Procedure Laterality Date     HYSTERECTOMY, PAP NO LONGER INDICATED       XR ANKLE SURGERY MARY LEFT Right        FAMILY HISTORY:  Family History   Problem Relation Age of Onset     Coronary Artery Disease Maternal Grandmother        SOCIAL HISTORY:  Social History     Socioeconomic History     Marital status:      Spouse name: None     Number of children: None     Years of education: None     Highest education level: None   Tobacco Use     Smoking status: Former     Types: Cigarettes     Smokeless tobacco: Never     Tobacco comments:     rarely smokes, 10/month   Vaping Use     Vaping Use: Never used   Substance and Sexual Activity     Alcohol use: Yes     Comment: occasionally     Drug use: Never     Sexual activity: Yes     Partners: Male       Review of Systems:  Skin:        Eyes:       ENT:       Respiratory:  Positive for dyspnea on exertion;shortness of breath  Cardiovascular:  Negative;chest pain;lightheadedness;dizziness;edema Positive for;palpitations  Gastroenterology:      Genitourinary:       Musculoskeletal:       Neurologic:  Positive for numbness or tingling of hands  Psychiatric:       Heme/Lymph/Imm:  Positive for allergies  Endocrine:         Physical Exam:  Vitals: /76 (BP Location: Left arm, Patient Position: Sitting, Cuff  "Size: Adult Regular)   Pulse 71   Ht 1.676 m (5' 6\")   Wt 72.3 kg (159 lb 6.4 oz)   SpO2 99%   BMI 25.73 kg/m      Constitutional:  cooperative, alert and oriented, well developed, well nourished, in no acute distress   Fit in appearance    Skin:  warm and dry to the touch        Head:  normocephalic        Eyes:  pupils equal and round, conjunctivae and lids unremarkable, sclera white, no xanthalasma, EOMS intact, no nystagmus        ENT:  no pallor or cyanosis        Neck:  carotid pulses are full and equal bilaterally, JVP normal, no carotid bruit        Chest:  normal breath sounds, clear to auscultation, normal A-P diameter, normal symmetry, normal respiratory excursion, no use of accessory muscles        Cardiac: regular rhythm, normal S1/S2, no S3 or S4, apical impulse not displaced, no murmurs, gallops or rubs                  Abdomen:  abdomen soft;BS normoactive        Vascular: pulses full and equal                                      Extremities and Muscular Skeletal:  no edema           Neurological:  no gross motor deficits        Psych:  affect appropriate, oriented to time, person and place     Recent Lab Results:  LIPID RESULTS:  Lab Results   Component Value Date    CHOL 186 04/11/2022    CHOL 231 (H) 11/05/2019    HDL 71 04/11/2022    HDL 95 11/05/2019    LDL 52 04/11/2022     (H) 11/05/2019    TRIG 313 (H) 04/11/2022    TRIG 166 (H) 11/05/2019    CHOLHDLRATIO 2.3 07/06/2010       LIVER ENZYME RESULTS:  Lab Results   Component Value Date    AST 22 07/06/2010       CBC RESULTS:  Lab Results   Component Value Date    WBC 8.7 04/02/2002    RBC 4.12 (L) 04/02/2002    HGB 13.3 04/02/2002    HCT 39.8 04/02/2002    MCV 97 04/02/2002    RDW 12.7 04/02/2002     04/02/2002       BMP RESULTS:  Lab Results   Component Value Date     04/11/2022     02/16/2021    POTASSIUM 3.8 04/11/2022    POTASSIUM 3.9 02/16/2021    CHLORIDE 107 04/11/2022    CHLORIDE 99 02/16/2021    CO2 30 " 04/11/2022    CO2 28 07/27/2020    ANIONGAP 3 04/11/2022    ANIONGAP 11.1 07/27/2020     (H) 04/11/2022     (A) 02/16/2021    BUN 21 04/11/2022    BUN 16 02/16/2021    CR 0.80 04/11/2022    CR 0.97 (A) 02/16/2021    GFRESTIMATED 88 04/11/2022    GFRESTIMATED >60 02/16/2021    GFRESTBLACK 81 07/27/2020    CARRIE 8.3 (L) 04/11/2022    CARRIE 9.5 02/16/2021        A1C RESULTS:  No results found for: A1C    INR RESULTS:  No results found for: INR      Ac Lombrado MD, [facility]    CC  No referring provider defined for this encounter.

## 2023-01-24 ENCOUNTER — MYC MEDICAL ADVICE (OUTPATIENT)
Dept: FAMILY MEDICINE | Facility: CLINIC | Age: 53
End: 2023-01-24
Payer: COMMERCIAL

## 2023-02-13 ENCOUNTER — OFFICE VISIT (OUTPATIENT)
Dept: OTOLARYNGOLOGY | Facility: CLINIC | Age: 53
End: 2023-02-13
Payer: COMMERCIAL

## 2023-02-13 VITALS
DIASTOLIC BLOOD PRESSURE: 76 MMHG | BODY MASS INDEX: 25.55 KG/M2 | WEIGHT: 159 LBS | HEIGHT: 66 IN | TEMPERATURE: 98.1 F | SYSTOLIC BLOOD PRESSURE: 106 MMHG

## 2023-02-13 DIAGNOSIS — J34.2 DEVIATED NASAL SEPTUM: ICD-10-CM

## 2023-02-13 DIAGNOSIS — J32.8 OTHER CHRONIC SINUSITIS: Primary | ICD-10-CM

## 2023-02-13 PROCEDURE — 99204 OFFICE O/P NEW MOD 45 MIN: CPT | Mod: 25 | Performed by: OTOLARYNGOLOGY

## 2023-02-13 PROCEDURE — 31231 NASAL ENDOSCOPY DX: CPT | Performed by: OTOLARYNGOLOGY

## 2023-02-13 RX ORDER — METHYLPREDNISOLONE 4 MG
TABLET, DOSE PACK ORAL
Qty: 21 TABLET | Refills: 0 | Status: SHIPPED | OUTPATIENT
Start: 2023-02-13 | End: 2023-03-13

## 2023-02-13 RX ORDER — CLARITHROMYCIN 500 MG
500 TABLET ORAL 2 TIMES DAILY
Qty: 40 TABLET | Refills: 0 | Status: SHIPPED | OUTPATIENT
Start: 2023-02-13 | End: 2023-12-22

## 2023-02-13 RX ORDER — CEFDINIR 300 MG/1
CAPSULE ORAL 2 TIMES DAILY
COMMUNITY
Start: 2023-02-08 | End: 2023-03-13

## 2023-02-13 ASSESSMENT — PAIN SCALES - GENERAL: PAINLEVEL: SEVERE PAIN (6)

## 2023-02-13 NOTE — PROGRESS NOTES
ENT Consultation    Dot Osborn who is a 52 year old female seen in consultation at the request of Tricia Hernandez.      History of Present Illness - Dot Osborn is a 52 year old female presents with a chief complaint of sinus related issues.  About 15 years ago functional endoscopic sinus surgery and septoplasty.  Still has little bit more difficulty breathing on the left than the right side.  Was doing fine for the first 5 years and then started having more problems especially in the winter with multiple recurrences of sinusitis at least 5 times during the winter.  She has been on multiple antibiotics since.  When she gets infection she feels pressure periorbital pressure maxillary pressure with yellowish and greenish discharge.  She is currently on cefdinir after the latest infection but still having issues.  Sense of taste and smell appear to be relatively intact.  She apparently was tested for allergies years ago appears to have multiple allergies.  She is on Zyrtec and fluticasone daily.  That does not seem to prevent her infections.  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          BP Readings from Last 1 Encounters:   02/13/23 106/76       BP noted to be well controlled today in office.     Dot Valencia IS NOT a smoker/uses chewing tobacco.        Past Medical History -   Past Medical History:   Diagnosis Date     Elevated BP without diagnosis of hypertension        Current Medications -   Current Outpatient Medications:      albuterol (PROAIR HFA/PROVENTIL HFA/VENTOLIN HFA) 108 (90 Base) MCG/ACT inhaler, Inhale 2 puffs into the lungs every 6 hours, Disp: 18 g, Rfl: 0     cefdinir (OMNICEF) 300 MG capsule, Take 300 mg by mouth 2 times daily, Disp: , Rfl:      Cetirizine HCl (ZYRTEC ALLERGY PO), , Disp: , Rfl:      estradiol (VIVELLE-DOT) 0.05 MG/24HR bi-weekly patch, Place 1 patch onto the skin twice a week, Disp: 24 patch,  "Rfl: 3     fluticasone (FLONASE) 50 MCG/ACT nasal spray, Spray 1 spray into both nostrils daily, Disp: , Rfl:      irbesartan-hydrochlorothiazide (AVALIDE) 150-12.5 MG tablet, Take 1 tablet by mouth daily, Disp: 90 tablet, Rfl: 3     multivitamin w/minerals (THERA-VIT-M) tablet, Take 1 tablet by mouth daily, Disp: , Rfl:      omega 3 1000 MG CAPS, , Disp: , Rfl:      traZODone (DESYREL) 50 MG tablet, TAKE 1 TO 4 TABLETS BY MOUTH AT BEDTIME (Patient not taking: Reported on 2/13/2023), Disp: 90 tablet, Rfl: 1    Allergies -   Allergies   Allergen Reactions     Lisinopril      cough     Sulfa Drugs Difficulty breathing, Nausea and Swelling       Social History -   Social History     Socioeconomic History     Marital status:    Tobacco Use     Smoking status: Former     Types: Cigarettes     Smokeless tobacco: Never     Tobacco comments:     rarely smokes, 10/month   Vaping Use     Vaping Use: Never used   Substance and Sexual Activity     Alcohol use: Yes     Comment: occasionally     Drug use: Never     Sexual activity: Yes     Partners: Male       Family History -   Family History   Problem Relation Age of Onset     Coronary Artery Disease Maternal Grandmother        Review of Systems - As per HPI and PMHx, otherwise review of system review of the head and neck negative. Otherwise 10+ review of system is negative    Physical Exam  /76 (BP Location: Right arm, Patient Position: Sitting, Cuff Size: Adult Regular)   Temp 98.1  F (36.7  C) (Temporal)   Ht 1.664 m (5' 5.5\")   Wt 72.1 kg (159 lb)   BMI 26.06 kg/m    BMI: Body mass index is 26.06 kg/m .    General - The patient is well nourished and well developed, and appears to have good nutritional status.  Alert and oriented to person and place, answers questions and cooperates with examination appropriately.    SKIN - No suspicious lesions or rashes.  Respiration - No respiratory distress.  Head and Face - Normocephalic and atraumatic, with no gross " asymmetry noted of the contour of the facial features.  The facial nerve is intact, with strong symmetric movements.    Voice and Breathing - The patient was breathing comfortably without the use of accessory muscles. The patients voice was clear and strong, and had appropriate pitch and quality.    Ears - Bilateral pinna and EACs with normal appearing overlying skin. Tympanic membrane intact with good mobility on pneumatic otoscopy bilaterally. Bony landmarks of the ossicular chain are normal. The tympanic membranes are normal in appearance. No retraction, perforation, or masses.  No fluid or purulence was seen in the external canal or the middle ear.     Eyes - Extraocular movements intact.  Sclera were not icteric or injected, conjunctiva were pink and moist.    Mouth - Examination of the oral cavity showed pink, healthy oral mucosa. No lesions or ulcerations noted.  The tongue was mobile and midline, and the dentition were in good condition.      Throat - The walls of the oropharynx were smooth, pink, moist, symmetric, and had no lesions or ulcerations.  The tonsillar pillars and soft palate were symmetric.  The uvula was midline on elevation.    Neck - Normal midline excursion of the laryngotracheal complex during swallowing.  Full range of motion on passive movement.  Palpation of the occipital, submental, submandibular, internal jugular chain, and supraclavicular nodes did not demonstrate any abnormal lymph nodes or masses.  The carotid pulse was palpable bilaterally.  Palpation of the thyroid was soft and smooth, with no nodules or goiter appreciated.  The trachea was mobile and midline.    Nose - External contour is symmetric, no gross deflection or scars.  Nasal mucosa is pale and moist with no abnormal mucus.  The septum was deviated to the left and mildly obstructive, turbinates of normal size and position.  No polyps, masses, or purulence noted on examination.    Neuro - Nonfocal neuro exam is normal,  CN 2 through 12 intact, normal gait and muscle tone.      Performed in clinic today:  To further evaluate the nasal cavity, I performed rigid nasal endoscopy.  I first sprayed the nasal cavity bilaterally with a mix of lidocaine and neosynephrine.  I then began on the left side using a 2.7mm, 30 degree rigid nasal endoscope.  The septum was deviated and the nasal airway was Partial obstruction.  No abnormal secretions, purulence, or polyps were noted. The left middle turbinate and middle meatus were partially visualized and in the anterior ethmoid mucosa quite edematous and somewhat polypoid in appearance.  Hard to see with the sinuses which will open on the left.  Looking up, the olfactory cleft was unobstructed.  Going further back, the sphenoethmoid recess was normal in appearance, with healthy appearing mucosa on the face of the sphenoid.  The nasopharynx was unremarkable, and the eustachian tube opening on this side was unobstructed.    I then turned my attention to the right side.  Once again, the septum was deviated, and the airway was open.  No abnormal secretions, purulence, polyps were noted.  The right middle turbinate and middle meatus were clearly visualized and maxillary ostium was small but open with antral mucosa somewhat edematous and anterior ethmoid mucosa somewhat edematous and polypoid.  Looking up, the olfactory cleft was unobstructed.  Going further back the right sphenoethmoid recess was normal in appearance, and eustachian tube opening was unobstructed.   Purple - [id number] [facility]      A/P - Dot Annie Osborn is a 52 year old female with chronic and recurrent sinusitis for the deviation of the septum to the left in spite of having had septoplasty in the past.  We discussed some further treatment and evaluation options.  Considering she has not had good results with cefdinir we will switch her to clarithromycin for 20 more days 500 twice daily.  Medrol Dosepak also prescribed.  Patient  will continue nasal steroid spray.  She will follow-up in a month.  In the meantime we will get a CT scan of the sinuses.      Willem Felix MD

## 2023-02-13 NOTE — LETTER
2/13/2023         RE: Dot Osborn  [id number] [place]  [facility] [id number]        Dear Colleague,    Thank you for referring your patient, Dot Osborn, to the [facility]. Please see a copy of my visit note below.    ENT Consultation    Dot Osborn who is a 52 year old female seen in consultation at the request of Tricia Hernandez.      History of Present Illness - Dot Osborn is a 52 year old female presents with a chief complaint of sinus related issues.  About 15 years ago functional endoscopic sinus surgery and septoplasty.  Still has little bit more difficulty breathing on the left than the right side.  Was doing fine for the first 5 years and then started having more problems especially in the winter with multiple recurrences of sinusitis at least 5 times during the winter.  She has been on multiple antibiotics since.  When she gets infection she feels pressure periorbital pressure maxillary pressure with yellowish and greenish discharge.  She is currently on cefdinir after the latest infection but still having issues.  Sense of taste and smell appear to be relatively intact.  She apparently was tested for allergies years ago appears to have multiple allergies.  She is on Zyrtec and fluticasone daily.  That does not seem to prevent her infections.  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          BP Readings from Last 1 Encounters:   02/13/23 106/76       BP noted to be well controlled today in office.     Dot Valencia IS NOT a smoker/uses chewing tobacco.        Past Medical History -   Past Medical History:   Diagnosis Date     Elevated BP without diagnosis of hypertension        Current Medications -   Current Outpatient Medications:      albuterol (PROAIR HFA/PROVENTIL HFA/VENTOLIN HFA) 108 (90 Base) MCG/ACT inhaler, Inhale 2 puffs into the lungs every 6 hours, Disp: 18 g,  "Rfl: 0     cefdinir (OMNICEF) 300 MG capsule, Take 300 mg by mouth 2 times daily, Disp: , Rfl:      Cetirizine HCl (ZYRTEC ALLERGY PO), , Disp: , Rfl:      estradiol (VIVELLE-DOT) 0.05 MG/24HR bi-weekly patch, Place 1 patch onto the skin twice a week, Disp: 24 patch, Rfl: 3     fluticasone (FLONASE) 50 MCG/ACT nasal spray, Spray 1 spray into both nostrils daily, Disp: , Rfl:      irbesartan-hydrochlorothiazide (AVALIDE) 150-12.5 MG tablet, Take 1 tablet by mouth daily, Disp: 90 tablet, Rfl: 3     multivitamin w/minerals (THERA-VIT-M) tablet, Take 1 tablet by mouth daily, Disp: , Rfl:      omega 3 1000 MG CAPS, , Disp: , Rfl:      traZODone (DESYREL) 50 MG tablet, TAKE 1 TO 4 TABLETS BY MOUTH AT BEDTIME (Patient not taking: Reported on 2/13/2023), Disp: 90 tablet, Rfl: 1    Allergies -   Allergies   Allergen Reactions     Lisinopril      cough     Sulfa Drugs Difficulty breathing, Nausea and Swelling       Social History -   Social History     Socioeconomic History     Marital status:    Tobacco Use     Smoking status: Former     Types: Cigarettes     Smokeless tobacco: Never     Tobacco comments:     rarely smokes, 10/month   Vaping Use     Vaping Use: Never used   Substance and Sexual Activity     Alcohol use: Yes     Comment: occasionally     Drug use: Never     Sexual activity: Yes     Partners: Male       Family History -   Family History   Problem Relation Age of Onset     Coronary Artery Disease Maternal Grandmother        Review of Systems - As per HPI and PMHx, otherwise review of system review of the head and neck negative. Otherwise 10+ review of system is negative    Physical Exam  /76 (BP Location: Right arm, Patient Position: Sitting, Cuff Size: Adult Regular)   Temp 98.1  F (36.7  C) (Temporal)   Ht 1.664 m (5' 5.5\")   Wt 72.1 kg (159 lb)   BMI 26.06 kg/m    BMI: Body mass index is 26.06 kg/m .    General - The patient is well nourished and well developed, and appears to have good " nutritional status.  Alert and oriented to person and place, answers questions and cooperates with examination appropriately.    SKIN - No suspicious lesions or rashes.  Respiration - No respiratory distress.  Head and Face - Normocephalic and atraumatic, with no gross asymmetry noted of the contour of the facial features.  The facial nerve is intact, with strong symmetric movements.    Voice and Breathing - The patient was breathing comfortably without the use of accessory muscles. The patients voice was clear and strong, and had appropriate pitch and quality.    Ears - Bilateral pinna and EACs with normal appearing overlying skin. Tympanic membrane intact with good mobility on pneumatic otoscopy bilaterally. Bony landmarks of the ossicular chain are normal. The tympanic membranes are normal in appearance. No retraction, perforation, or masses.  No fluid or purulence was seen in the external canal or the middle ear.     Eyes - Extraocular movements intact.  Sclera were not icteric or injected, conjunctiva were pink and moist.    Mouth - Examination of the oral cavity showed pink, healthy oral mucosa. No lesions or ulcerations noted.  The tongue was mobile and midline, and the dentition were in good condition.      Throat - The walls of the oropharynx were smooth, pink, moist, symmetric, and had no lesions or ulcerations.  The tonsillar pillars and soft palate were symmetric.  The uvula was midline on elevation.    Neck - Normal midline excursion of the laryngotracheal complex during swallowing.  Full range of motion on passive movement.  Palpation of the occipital, submental, submandibular, internal jugular chain, and supraclavicular nodes did not demonstrate any abnormal lymph nodes or masses.  The carotid pulse was palpable bilaterally.  Palpation of the thyroid was soft and smooth, with no nodules or goiter appreciated.  The trachea was mobile and midline.    Nose - External contour is symmetric, no gross  deflection or scars.  Nasal mucosa is pale and moist with no abnormal mucus.  The septum was deviated to the left and mildly obstructive, turbinates of normal size and position.  No polyps, masses, or purulence noted on examination.    Neuro - Nonfocal neuro exam is normal, CN 2 through 12 intact, normal gait and muscle tone.      Performed in clinic today:  To further evaluate the nasal cavity, I performed rigid nasal endoscopy.  I first sprayed the nasal cavity bilaterally with a mix of lidocaine and neosynephrine.  I then began on the left side using a 2.7mm, 30 degree rigid nasal endoscope.  The septum was deviated and the nasal airway was Partial obstruction.  No abnormal secretions, purulence, or polyps were noted. The left middle turbinate and middle meatus were partially visualized and in the anterior ethmoid mucosa quite edematous and somewhat polypoid in appearance.  Hard to see with the sinuses which will open on the left.  Looking up, the olfactory cleft was unobstructed.  Going further back, the sphenoethmoid recess was normal in appearance, with healthy appearing mucosa on the face of the sphenoid.  The nasopharynx was unremarkable, and the eustachian tube opening on this side was unobstructed.    I then turned my attention to the right side.  Once again, the septum was deviated, and the airway was open.  No abnormal secretions, purulence, polyps were noted.  The right middle turbinate and middle meatus were clearly visualized and maxillary ostium was small but open with antral mucosa somewhat edematous and anterior ethmoid mucosa somewhat edematous and polypoid.  Looking up, the olfactory cleft was unobstructed.  Going further back the right sphenoethmoid recess was normal in appearance, and eustachian tube opening was unobstructed.   Purple - [id number] [facility]      A/P - June Annie Osborn is a 52 year old female with chronic and recurrent sinusitis for the deviation of the septum to the left in  spite of having had septoplasty in the past.  We discussed some further treatment and evaluation options.  Considering she has not had good results with cefdinir we will switch her to clarithromycin for 20 more days 500 twice daily.  Medrol Dosepak also prescribed.  Patient will continue nasal steroid spray.  She will follow-up in a month.  In the meantime we will get a CT scan of the sinuses.      Willem Felix MD        Again, thank you for allowing me to participate in the care of your patient.        Sincerely,        Willem Felix MD, MD

## 2023-03-06 NOTE — PROGRESS NOTES
History of Present Illness - Dot Annie Osborn is a 52 year old female presenting in clinic today for a recheck on Patient presents with:  RECHECK: sinus    Patient with a prior history of functional endoscopic sinus surgery septoplasty presented with further issues specially left side pressure discomfort drainage bilaterally but more on the left than the right.  She was noted to have a septal deviation buckling of the cartilage to the left.  Patient was given antibiotics and indeed it did help but now problems began to come back.  Patient had CT scan of the sinuses repeated.    CT SCAN OF THE PARANASAL SINUSES AND FACE  3/9/2023 3:44 PM      HISTORY: Other chronic sinusitis.     TECHNIQUE:  Noncontrast axial scans and coronal reformations.  Radiation dose for this scan was reduced using automated exposure  control, adjustment of the mA and/or kV according to patient size, or  iterative reconstruction technique.     COMPARISON: None.     FINDINGS: Patient is status postoperative bilateral antrostomies and  ethmoidectomies.     Frontal sinuses:  Mucoperiosteal thickening is seen in the left  frontal sinus. Right frontal sinuses are clear.     Ethmoid sinuses:  Postop changes status post resection of the inferior  aspect ethmoid sinus is noted. There is mucoperiosteal thickening  superior aspects of the mid to anterior ethmoid sinuses.     Sphenoid sinus:  Mild thickening is seen in the bilateral sphenoid  sinuses.     Right maxillary sinus:  Antrostomy noted. Mild mucoperiosteal  thickening. No air-fluid levels.     Left maxillary sinus:  Antrostomy noted. Moderate mucoperiosteal  thickening. No air-fluid levels.     Nasal septum:  Mild levoconvex upper and dextroconvex lower curvature  of the nasal septum.     Turbinates and nasal cavity:  The superior nasal turbinates may have  been partially resected. Bilateral maxillary antrostomies. Middle and  inferior nasal turbinates are normal. Nasal cavity is otherwise  normal  in appearance.     Laminae papyracea, cribriform plate and orbits:   Normal.      Additional findings: None.                                                                      IMPRESSION:    1. Postop changes of the paranasal sinuses described above.  2. Mild ethmoid, sphenoid, and right maxillary sinus disease. Moderate  chronic appearing left maxillary sinus disease.        BP Readings from Last 1 Encounters:   03/13/23 102/72       BP noted to be well controlled today in office.     Dot Valencia IS NOT a smoker/uses chewing tobacco.      Past Medical History -   Past Medical History:   Diagnosis Date     Elevated BP without diagnosis of hypertension        Current Medications -   Current Outpatient Medications:      Cetirizine HCl (ZYRTEC ALLERGY PO), , Disp: , Rfl:      fluticasone (FLONASE) 50 MCG/ACT nasal spray, Spray 1 spray into both nostrils daily, Disp: , Rfl:      irbesartan-hydrochlorothiazide (AVALIDE) 150-12.5 MG tablet, Take 1 tablet by mouth daily, Disp: 90 tablet, Rfl: 3     multivitamin w/minerals (THERA-VIT-M) tablet, Take 1 tablet by mouth daily, Disp: , Rfl:      omega 3 1000 MG CAPS, , Disp: , Rfl:      albuterol (PROAIR HFA/PROVENTIL HFA/VENTOLIN HFA) 108 (90 Base) MCG/ACT inhaler, Inhale 2 puffs into the lungs every 6 hours (Patient not taking: Reported on 3/13/2023), Disp: 18 g, Rfl: 0     estradiol (VIVELLE-DOT) 0.05 MG/24HR bi-weekly patch, Place 1 patch onto the skin twice a week (Patient not taking: Reported on 3/13/2023), Disp: 24 patch, Rfl: 3     traZODone (DESYREL) 50 MG tablet, TAKE 1 TO 4 TABLETS BY MOUTH AT BEDTIME (Patient not taking: Reported on 2/13/2023), Disp: 90 tablet, Rfl: 1    Allergies -   Allergies   Allergen Reactions     Lisinopril      cough     Sulfa Drugs Difficulty breathing, Nausea and Swelling       Social History -   Social History     Socioeconomic History     Marital status:    Tobacco Use     Smoking status: Former     Types: Cigarettes      "Smokeless tobacco: Never     Tobacco comments:     rarely smokes, 10/month   Vaping Use     Vaping Use: Never used   Substance and Sexual Activity     Alcohol use: Yes     Comment: occasionally     Drug use: Never     Sexual activity: Yes     Partners: Male       Family History -   Family History   Problem Relation Age of Onset     Coronary Artery Disease Maternal Grandmother        Review of Systems - As per HPI and PMHx, otherwise review of system review of the head and neck negative. Otherwise 10+ review of system is negative    Physical Exam  /72 (BP Location: Right arm, Patient Position: Sitting, Cuff Size: Adult Regular)   Temp 98.2  F (36.8  C) (Temporal)   Ht 1.664 m (5' 5.5\")   Wt 70.3 kg (155 lb)   BMI 25.40 kg/m    BMI: Body mass index is 25.4 kg/m .    General - The patient is well nourished and well developed, and appears to have good nutritional status.  Alert and oriented to person and place, answers questions and cooperates with examination appropriately.    SKIN - No suspicious lesions or rashes.  Respiration - No respiratory distress.  Head and Face - Normocephalic and atraumatic, with no gross asymmetry noted of the contour of the facial features.  The facial nerve is intact, with strong symmetric movements.    Voice and Breathing - The patient was breathing comfortably without the use of accessory muscles. The patients voice was clear and strong, and had appropriate pitch and quality.      Eyes - Extraocular movements intact.  Sclera were not icteric or injected, conjunctiva were pink and moist.      Nose - External contour is symmetric, no gross deflection or scars.  Nasal mucosa is pink and moist with no abnormal mucus.  The septum was deviated to the left and somewhat obstructive, turbinates of enlarged size and position.  No polyps, masses, or purulence noted on examination.    Neuro - Nonfocal neuro exam is normal, CN 2 through 12 intact, normal gait and muscle tone.      Performed " in clinic today:  No procedures preformed in clinic today      A/P - Dot Valencia KARINE Osborn is a 52 year old female Patient presents with:  RECHECK: sinus    We discussed the CT scan the patient in great detail.  We discussed medical versus surgical options.  Risks of surgery discussed today because of potential high risk due to revision procedure the patient the sinuses involving frontal area needing likely navigation system possible sinus balloon to dilate some scar tissue in the frontal sinus on the left.  We discussed revision septoplasty and associated problems with that.  She is interested in trial of just conservative options due to some of the orthopedic surgery she will require the summer.  She will use saline and fluticasone twice daily.    Dot Valencia should follow up in 4 months.            Willem Felix MD

## 2023-03-09 ENCOUNTER — HOSPITAL ENCOUNTER (OUTPATIENT)
Dept: CT IMAGING | Facility: CLINIC | Age: 53
Discharge: HOME | End: 2023-03-09
Attending: OTOLARYNGOLOGY | Admitting: OTOLARYNGOLOGY
Payer: COMMERCIAL

## 2023-03-09 DIAGNOSIS — J32.8 OTHER CHRONIC SINUSITIS: ICD-10-CM

## 2023-03-09 PROCEDURE — 70486 CT MAXILLOFACIAL W/O DYE: CPT

## 2023-03-13 ENCOUNTER — OFFICE VISIT (OUTPATIENT)
Dept: OTOLARYNGOLOGY | Facility: CLINIC | Age: 53
End: 2023-03-13
Attending: PHYSICIAN ASSISTANT
Payer: COMMERCIAL

## 2023-03-13 VITALS
TEMPERATURE: 98.2 F | SYSTOLIC BLOOD PRESSURE: 102 MMHG | DIASTOLIC BLOOD PRESSURE: 72 MMHG | HEIGHT: 66 IN | BODY MASS INDEX: 24.91 KG/M2 | WEIGHT: 155 LBS

## 2023-03-13 DIAGNOSIS — J32.4 CHRONIC PANSINUSITIS: ICD-10-CM

## 2023-03-13 DIAGNOSIS — J34.2 DEVIATED NASAL SEPTUM: Primary | ICD-10-CM

## 2023-03-13 PROCEDURE — 99214 OFFICE O/P EST MOD 30 MIN: CPT | Performed by: OTOLARYNGOLOGY

## 2023-03-13 ASSESSMENT — PAIN SCALES - GENERAL: PAINLEVEL: NO PAIN (0)

## 2023-03-13 NOTE — LETTER
3/13/2023         RE: Dot Osborn  [address] [place]  [facility] [address]        Dear Colleague,    Thank you for referring your patient, Dot Osborn, to the [facility]. Please see a copy of my visit note below.    History of Present Illness - Dot Osborn is a 52 year old female presenting in clinic today for a recheck on Patient presents with:  RECHECK: sinus    Patient with a prior history of functional endoscopic sinus surgery septoplasty presented with further issues specially left side pressure discomfort drainage bilaterally but more on the left than the right.  She was noted to have a septal deviation buckling of the cartilage to the left.  Patient was given antibiotics and indeed it did help but now problems began to come back.  Patient had CT scan of the sinuses repeated.    CT SCAN OF THE PARANASAL SINUSES AND FACE  3/9/2023 3:44 PM      HISTORY: Other chronic sinusitis.     TECHNIQUE:  Noncontrast axial scans and coronal reformations.  Radiation dose for this scan was reduced using automated exposure  control, adjustment of the mA and/or kV according to patient size, or  iterative reconstruction technique.     COMPARISON: None.     FINDINGS: Patient is status postoperative bilateral antrostomies and  ethmoidectomies.     Frontal sinuses:  Mucoperiosteal thickening is seen in the left  frontal sinus. Right frontal sinuses are clear.     Ethmoid sinuses:  Postop changes status post resection of the inferior  aspect ethmoid sinus is noted. There is mucoperiosteal thickening  superior aspects of the mid to anterior ethmoid sinuses.     Sphenoid sinus:  Mild thickening is seen in the bilateral sphenoid  sinuses.     Right maxillary sinus:  Antrostomy noted. Mild mucoperiosteal  thickening. No air-fluid levels.     Left maxillary sinus:  Antrostomy noted. Moderate mucoperiosteal  thickening. No air-fluid levels.     Nasal septum:  Mild levoconvex  upper and dextroconvex lower curvature  of the nasal septum.     Turbinates and nasal cavity:  The superior nasal turbinates may have  been partially resected. Bilateral maxillary antrostomies. Middle and  inferior nasal turbinates are normal. Nasal cavity is otherwise normal  in appearance.     Laminae papyracea, cribriform plate and orbits:   Normal.      Additional findings: None.                                                                      IMPRESSION:    1. Postop changes of the paranasal sinuses described above.  2. Mild ethmoid, sphenoid, and right maxillary sinus disease. Moderate  chronic appearing left maxillary sinus disease.        BP Readings from Last 1 Encounters:   03/13/23 102/72       BP noted to be well controlled today in office.     Dot Valencia IS NOT a smoker/uses chewing tobacco.      Past Medical History -   Past Medical History:   Diagnosis Date     Elevated BP without diagnosis of hypertension        Current Medications -   Current Outpatient Medications:      Cetirizine HCl (ZYRTEC ALLERGY PO), , Disp: , Rfl:      fluticasone (FLONASE) 50 MCG/ACT nasal spray, Spray 1 spray into both nostrils daily, Disp: , Rfl:      irbesartan-hydrochlorothiazide (AVALIDE) 150-12.5 MG tablet, Take 1 tablet by mouth daily, Disp: 90 tablet, Rfl: 3     multivitamin w/minerals (THERA-VIT-M) tablet, Take 1 tablet by mouth daily, Disp: , Rfl:      omega 3 1000 MG CAPS, , Disp: , Rfl:      albuterol (PROAIR HFA/PROVENTIL HFA/VENTOLIN HFA) 108 (90 Base) MCG/ACT inhaler, Inhale 2 puffs into the lungs every 6 hours (Patient not taking: Reported on 3/13/2023), Disp: 18 g, Rfl: 0     estradiol (VIVELLE-DOT) 0.05 MG/24HR bi-weekly patch, Place 1 patch onto the skin twice a week (Patient not taking: Reported on 3/13/2023), Disp: 24 patch, Rfl: 3     traZODone (DESYREL) 50 MG tablet, TAKE 1 TO 4 TABLETS BY MOUTH AT BEDTIME (Patient not taking: Reported on 2/13/2023), Disp: 90 tablet, Rfl: 1    Allergies -  "  Allergies   Allergen Reactions     Lisinopril      cough     Sulfa Drugs Difficulty breathing, Nausea and Swelling       Social History -   Social History     Socioeconomic History     Marital status:    Tobacco Use     Smoking status: Former     Types: Cigarettes     Smokeless tobacco: Never     Tobacco comments:     rarely smokes, 10/month   Vaping Use     Vaping Use: Never used   Substance and Sexual Activity     Alcohol use: Yes     Comment: occasionally     Drug use: Never     Sexual activity: Yes     Partners: Male       Family History -   Family History   Problem Relation Age of Onset     Coronary Artery Disease Maternal Grandmother        Review of Systems - As per HPI and PMHx, otherwise review of system review of the head and neck negative. Otherwise 10+ review of system is negative    Physical Exam  /72 (BP Location: Right arm, Patient Position: Sitting, Cuff Size: Adult Regular)   Temp 98.2  F (36.8  C) (Temporal)   Ht 1.664 m (5' 5.5\")   Wt 70.3 kg (155 lb)   BMI 25.40 kg/m    BMI: Body mass index is 25.4 kg/m .    General - The patient is well nourished and well developed, and appears to have good nutritional status.  Alert and oriented to person and place, answers questions and cooperates with examination appropriately.    SKIN - No suspicious lesions or rashes.  Respiration - No respiratory distress.  Head and Face - Normocephalic and atraumatic, with no gross asymmetry noted of the contour of the facial features.  The facial nerve is intact, with strong symmetric movements.    Voice and Breathing - The patient was breathing comfortably without the use of accessory muscles. The patients voice was clear and strong, and had appropriate pitch and quality.      Eyes - Extraocular movements intact.  Sclera were not icteric or injected, conjunctiva were pink and moist.      Nose - External contour is symmetric, no gross deflection or scars.  Nasal mucosa is pink and moist with no " abnormal mucus.  The septum was deviated to the left and somewhat obstructive, turbinates of enlarged size and position.  No polyps, masses, or purulence noted on examination.    Neuro - Nonfocal neuro exam is normal, CN 2 through 12 intact, normal gait and muscle tone.      Performed in clinic today:  No procedures preformed in clinic today      A/P - Dot Osborn is a 52 year old female Patient presents with:  RECHECK: sinus    We discussed the CT scan the patient in great detail.  We discussed medical versus surgical options.  Risks of surgery discussed today because of potential high risk due to revision procedure the patient the sinuses involving frontal area needing likely navigation system possible sinus balloon to dilate some scar tissue in the frontal sinus on the left.  We discussed revision septoplasty and associated problems with that.  She is interested in trial of just conservative options due to some of the orthopedic surgery she will require the summer.  She will use saline and fluticasone twice daily.    Dot Valencia should follow up in 4 months.            Willem Felix MD            Again, thank you for allowing me to participate in the care of your patient.        Sincerely,        Willem Felix MD, MD

## 2023-05-30 ENCOUNTER — MYC MEDICAL ADVICE (OUTPATIENT)
Dept: FAMILY MEDICINE | Facility: CLINIC | Age: 53
End: 2023-05-30
Payer: COMMERCIAL

## 2023-05-31 ENCOUNTER — VIRTUAL VISIT (OUTPATIENT)
Dept: FAMILY MEDICINE | Facility: CLINIC | Age: 53
End: 2023-05-31
Payer: COMMERCIAL

## 2023-05-31 DIAGNOSIS — R00.2 PALPITATIONS: Primary | ICD-10-CM

## 2023-05-31 DIAGNOSIS — G47.00 INSOMNIA, UNSPECIFIED TYPE: ICD-10-CM

## 2023-05-31 PROCEDURE — 99213 OFFICE O/P EST LOW 20 MIN: CPT | Mod: 93 | Performed by: PHYSICIAN ASSISTANT

## 2023-05-31 RX ORDER — TRAZODONE HYDROCHLORIDE 50 MG/1
TABLET, FILM COATED ORAL
Qty: 90 TABLET | Refills: 3 | Status: SHIPPED | OUTPATIENT
Start: 2023-05-31 | End: 2024-05-14

## 2023-05-31 RX ORDER — METOPROLOL SUCCINATE 25 MG/1
12.5 TABLET, EXTENDED RELEASE ORAL DAILY
Qty: 90 TABLET | Refills: 1 | Status: SHIPPED | OUTPATIENT
Start: 2023-05-31 | End: 2024-05-14

## 2023-05-31 NOTE — TELEPHONE ENCOUNTER
Please call patient to see if we could do a phone or video visit today in open slot to further discuss her symptoms and further testing.     Tricia Hernandez PA-C

## 2023-05-31 NOTE — TELEPHONE ENCOUNTER
Patient is not having this symptom right now.  She states she has more of an anxiety feeling.    No shortness of breath with this.  No chest pain with this.  No arm pain with this.  Box breathing does help a little.    She is going to do her CT next week.    Ruth Villasenor RN on 5/31/2023 at 8:22 AM

## 2023-05-31 NOTE — PROGRESS NOTES
Annie is a 53 year old who is being evaluated via a billable telephone visit.      What phone number would you like to be contacted at? [phone]   How would you like to obtain your AVS? Nehemiah    Distant Location (provider location):  On-site    Subjective   Annie is a 53 year old, presenting for the following health issues:  Palpitations (Flutters)        5/31/2023     1:52 PM   Additional Questions   Roomed by Gustabo CARLTON   Accompanied by Self         5/31/2023     1:52 PM   Patient Reported Additional Medications   Patient reports taking the following new medications none     History of Present Illness       Vascular Disease:  She presents for follow up of vascular disease.  She never takes nitroglycerin. She is not taking daily aspirin.    She eats 4 or more servings of fruits and vegetables daily.She consumes 0 sweetened beverage(s) daily.She exercises with enough effort to increase her heart rate 30 to 60 minutes per day.  She exercises with enough effort to increase her heart rate 3 or less days per week.   She is taking medications regularly.     Spoke with patient today via telephone visit regarding heart palpitations. This has been worsening in the last couple weeks. She reports it is the sensation that her heart is fluttering. This can happen both day and night. She denies any chest pain, shortness of breath or lightheadedness. She does note it is worse with driving (this is anxiety provoking for her). At times happens when she is feeling more anxious but not always. Drinks good amount of water during the day. 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 She did have a holter monitor in the past showing SVT runs. This feels similar.     Review of Systems   Constitutional, HEENT, cardiovascular, pulmonary, GI, , musculoskeletal, neuro, skin, endocrine and psych systems are negative, except as otherwise noted.      Objective           Vitals:  No  vitals were obtained today due to virtual visit.    Physical Exam   healthy, alert and no distress  PSYCH: Alert and oriented times 3; coherent speech, normal   rate and volume, able to articulate logical thoughts, able   to abstract reason, no tangential thoughts, no hallucinations   or delusions  Her affect is normal  RESP: No cough, no audible wheezing, able to talk in full sentences  Remainder of exam unable to be completed due to telephone visits    Assessment & Plan     Palpitations  Symptoms consistent with heart palpitations and SVT runs likely. Will trial a very low dose of Metoprolol daily.  Appropriate use, side effects and dose titration if needed discussed. Reviewed red flag symptoms that should prompt immediate care in the ER. She does have a calcium CT scoring testing coming up as well.   - metoprolol succinate ER (TOPROL XL) 25 MG 24 hr tablet; Take 0.5 tablets (12.5 mg) by mouth daily    Insomnia, unspecified type  - traZODone (DESYREL) 50 MG tablet; TAKE 1 TO 4 TABLETS BY MOUTH AT BEDTIME    The patient indicates understanding of these issues and agrees with the plan.    Tricia Hernandez PA-C  [facility]    Phone call duration: 7 minutes

## 2023-06-06 ENCOUNTER — ANCILLARY PROCEDURE (OUTPATIENT)
Dept: CT IMAGING | Facility: CLINIC | Age: 53
End: 2023-06-06
Attending: INTERNAL MEDICINE
Payer: COMMERCIAL

## 2023-06-06 DIAGNOSIS — I10 ESSENTIAL HYPERTENSION: ICD-10-CM

## 2023-06-06 PROCEDURE — 75571 CT HRT W/O DYE W/CA TEST: CPT | Mod: GC | Performed by: STUDENT IN AN ORGANIZED HEALTH CARE EDUCATION/TRAINING PROGRAM

## 2023-06-08 NOTE — RESULT ENCOUNTER NOTE
Results and recommendations routed Via My Chart with call back information if needed.   Good news.  Cholesterol therapy not needed.

## 2023-08-02 ENCOUNTER — TRANSFERRED RECORDS (OUTPATIENT)
Dept: HEALTH INFORMATION MANAGEMENT | Facility: CLINIC | Age: 53
End: 2023-08-02
Payer: COMMERCIAL

## 2023-08-02 LAB — PHQ9 SCORE: 16

## 2023-08-10 ENCOUNTER — TELEPHONE (OUTPATIENT)
Dept: FAMILY MEDICINE | Facility: CLINIC | Age: 53
End: 2023-08-10

## 2023-08-10 NOTE — TELEPHONE ENCOUNTER
Reason for Call:  Appointment Request    Patient requesting this type of appt: Pre-op    Requested provider: Tricia Hernandez    Reason patient unable to be scheduled: Not within requested timeframe    When does patient want to be seen/preferred time: 3-7 days    Comments: Pt would like call back if pcp can see pt for pre op appointment for ankle surgery scheduled for 8/21/23. No current openings with pcp or any provider in clinic within requested timeframe. Please call pt back to discuss.    Could we send this information to you in [place] or would you prefer to receive a phone call?:   Patient would prefer a phone call   Okay to leave a detailed message?: No at Cell number on file:    Telephone Information:   Mobile [phone]       Call taken on 8/10/2023 at 11:46 AM by Daiana Booth

## 2023-08-11 ENCOUNTER — TELEPHONE (OUTPATIENT)
Dept: FAMILY MEDICINE | Facility: CLINIC | Age: 53
End: 2023-08-11

## 2023-08-11 NOTE — TELEPHONE ENCOUNTER
Please advise if patient can be worked in next week for a pre op physical. DOS is 8/21/23, ankle surgery. Marya Santiago LPN

## 2023-08-11 NOTE — TELEPHONE ENCOUNTER
Reason for Call:  Appointment Request    Patient requesting this type of appt: Pre-op    Requested provider:  any    Reason patient unable to be scheduled: Not within requested timeframe    When does patient want to be seen/preferred time: 3-7 days    Comments: n/a    Could we send this information to you in [facility] or would you prefer to receive a phone call?:   Patient would prefer a phone call   Okay to leave a detailed message?: no at Home number on file [phone] (home)    Call taken on 8/11/2023 at 1:34 PM by Emmanuelle Gagnon

## 2023-08-14 NOTE — TELEPHONE ENCOUNTER
Can use my approval required for Wednesday at 11:30 with arrival at 11:10AM.     RICHARD Hooper CNP  Questions or concerns please feel free to send me a [facility] message or call me  Phone : [phone]

## 2023-08-17 ENCOUNTER — OFFICE VISIT (OUTPATIENT)
Dept: FAMILY MEDICINE | Facility: OTHER | Age: 53
End: 2023-08-17
Payer: COMMERCIAL

## 2023-08-17 VITALS
DIASTOLIC BLOOD PRESSURE: 64 MMHG | RESPIRATION RATE: 20 BRPM | HEART RATE: 78 BPM | BODY MASS INDEX: 22.99 KG/M2 | TEMPERATURE: 98.2 F | HEIGHT: 65 IN | OXYGEN SATURATION: 100 % | WEIGHT: 138 LBS | SYSTOLIC BLOOD PRESSURE: 116 MMHG

## 2023-08-17 DIAGNOSIS — Z01.818 PREOP GENERAL PHYSICAL EXAM: Primary | ICD-10-CM

## 2023-08-17 DIAGNOSIS — Z87.81 S/P ORIF (OPEN REDUCTION INTERNAL FIXATION) FRACTURE: ICD-10-CM

## 2023-08-17 DIAGNOSIS — I10 ESSENTIAL HYPERTENSION: ICD-10-CM

## 2023-08-17 DIAGNOSIS — Z98.890 S/P ORIF (OPEN REDUCTION INTERNAL FIXATION) FRACTURE: ICD-10-CM

## 2023-08-17 DIAGNOSIS — M21.611 BUNION, RIGHT: ICD-10-CM

## 2023-08-17 DIAGNOSIS — R00.2 PALPITATIONS: ICD-10-CM

## 2023-08-17 PROBLEM — J30.9 ALLERGIC RHINITIS: Status: ACTIVE | Noted: 2023-08-17

## 2023-08-17 LAB
ANION GAP SERPL CALCULATED.3IONS-SCNC: 14 MMOL/L (ref 7–15)
BUN SERPL-MCNC: 12.8 MG/DL (ref 6–20)
CALCIUM SERPL-MCNC: 9.2 MG/DL (ref 8.6–10)
CHLORIDE SERPL-SCNC: 97 MMOL/L (ref 98–107)
CREAT SERPL-MCNC: 1 MG/DL (ref 0.51–0.95)
DEPRECATED HCO3 PLAS-SCNC: 27 MMOL/L (ref 22–29)
ERYTHROCYTE [DISTWIDTH] IN BLOOD BY AUTOMATED COUNT: 12 % (ref 10–15)
GFR SERPL CREATININE-BSD FRML MDRD: 66 ML/MIN/1.73M2
GLUCOSE SERPL-MCNC: 84 MG/DL (ref 70–99)
HCT VFR BLD AUTO: 40 % (ref 35–47)
HGB BLD-MCNC: 13.7 G/DL (ref 11.7–15.7)
MCH RBC QN AUTO: 33.5 PG (ref 26.5–33)
MCHC RBC AUTO-ENTMCNC: 34.3 G/DL (ref 31.5–36.5)
MCV RBC AUTO: 98 FL (ref 78–100)
PLATELET # BLD AUTO: 269 10E3/UL (ref 150–450)
POTASSIUM SERPL-SCNC: 3.8 MMOL/L (ref 3.4–5.3)
RBC # BLD AUTO: 4.09 10E6/UL (ref 3.8–5.2)
SODIUM SERPL-SCNC: 138 MMOL/L (ref 136–145)
WBC # BLD AUTO: 8.2 10E3/UL (ref 4–11)

## 2023-08-17 PROCEDURE — 36415 COLL VENOUS BLD VENIPUNCTURE: CPT | Performed by: PHYSICIAN ASSISTANT

## 2023-08-17 PROCEDURE — 85027 COMPLETE CBC AUTOMATED: CPT | Performed by: PHYSICIAN ASSISTANT

## 2023-08-17 PROCEDURE — 80048 BASIC METABOLIC PNL TOTAL CA: CPT | Performed by: PHYSICIAN ASSISTANT

## 2023-08-17 PROCEDURE — 99214 OFFICE O/P EST MOD 30 MIN: CPT | Performed by: PHYSICIAN ASSISTANT

## 2023-08-17 NOTE — PATIENT INSTRUCTIONS
For informational purposes only. Not to replace the advice of your health care provider. Copyright   2003,  [employer] [facility]. All rights reserved. Clinically reviewed by Shelby Cramer MD. [employer] [id number] - REV .  Preparing for Your Surgery  Getting started  A nurse will call you to review your health history and instructions. They will give you an arrival time based on your scheduled surgery time. Please be ready to share:  Your doctor's clinic name and phone number  Your medical, surgical, and anesthesia history  A list of allergies and sensitivities  A list of medicines, including herbal treatments and over-the-counter drugs  Whether the patient has a legal guardian (ask how to send us the papers in advance)  Please tell us if you're pregnant--or if there's any chance you might be pregnant. Some surgeries may injure a fetus (unborn baby), so they require a pregnancy test. Surgeries that are safe for a fetus don't always need a test, and you can choose whether to have one.   If you have a child who's having surgery, please ask for a copy of Preparing for Your Child's Surgery.    Preparing for surgery  Within 10 to 30 days of surgery: Have a pre-op exam (sometimes called an H&P, or History and Physical). This can be done at a clinic or pre-operative center.  If you're having a , you may not need this exam. Talk to your care team.  At your pre-op exam, talk to your care team about all medicines you take. If you need to stop any medicines before surgery, ask when to start taking them again.  We do this for your safety. Many medicines can make you bleed too much during surgery. Some change how well surgery (anesthesia) drugs work.  Call your insurance company to let them know you're having surgery. (If you don't have insurance, call [phone].)  Call your clinic if there's any change in your health. This includes signs of a cold or flu (sore throat, runny nose, cough, rash, fever). It also  includes a scrape or scratch near the surgery site.  If you have questions on the day of surgery, call your hospital or surgery center.  Eating and drinking guidelines  For your safety: Unless your surgeon tells you otherwise, follow the guidelines below.  Eat and drink as usual until 8 hours before you arrive for surgery. After that, no food or milk.  Nothing after 4am  If you drink alcohol: Stop drinking it the night before surgery.  HOLD all NSAIDs (ibuprofen, advil, motrin, aleve, naproxen, etc) from here on out  HOLD all vitamins here on out  HOLD irbesartan on day of    If your care team tells you to take medicine on the morning of surgery, it's okay to take it with a sip of water.  Preventing infection  Shower or bathe the night before and morning of your surgery. Follow the instructions your clinic gave you. (If no instructions, use regular soap.)  Don't shave or clip hair near your surgery site. We'll remove the hair if needed.  Don't smoke or vape the morning of surgery. You may chew nicotine gum up to 2 hours before surgery. A nicotine patch is okay.  Note: Some surgeries require you to completely quit smoking and nicotine. Check with your surgeon.  Your care team will make every effort to keep you safe from infection. We will:  Clean our hands often with soap and water (or an alcohol-based hand rub).  Clean the skin at your surgery site with a special soap that kills germs.  Give you a special gown to keep you warm. (Cold raises the risk of infection.)  Wear special hair covers, masks, gowns and gloves during surgery.  Give antibiotic medicine, if prescribed. Not all surgeries need antibiotics.  What to bring on the day of surgery  Photo ID and insurance card  Copy of your health care directive, if you have one  Glasses and hearing aids (bring cases)  You can't wear contacts during surgery  Inhaler and eye drops, if you use them (tell us about these when you arrive)  CPAP machine or breathing device, if  you use them  A few personal items, if spending the night  If you have . . .  A pacemaker, ICD (cardiac defibrillator) or other implant: Bring the ID card.  An implanted stimulator: Bring the remote control.  A legal guardian: Bring a copy of the certified (court-stamped) guardianship papers.  Please remove any jewelry, including body piercings. Leave jewelry and other valuables at home.  If you're going home the day of surgery  You must have a responsible adult drive you home. They should stay with you overnight as well.  If you don't have someone to stay with you, and you aren't safe to go home alone, we may keep you overnight. Insurance often won't pay for this.  After surgery  If it's hard to control your pain or you need more pain medicine, please call your surgeon's office.  Questions?   If you have any questions for your care team, list them here: _________________________________________________________________________________________________________________________________________________________________________ ____________________________________ ____________________________________ ____________________________________

## 2023-08-17 NOTE — PROGRESS NOTES
[address]  [facility] [address]  Phone: [phone]  Primary Provider: Tricia Hernandez  Pre-op Performing Provider: ANASTACIA LANE    PREOPERATIVE EVALUATION:  Today's date: 8/17/2023    Dot Osborn is a 53 year old female who presents for a preoperative evaluation.      8/17/2023     2:26 PM   Additional Questions   Roomed by Bertha TOLBERT   Accompanied by self       Surgical Information:  Surgery/Procedure: Right foot first metatarsal phalangeal arthrodesis  Surgery Location: [facility]  Surgeon:   Surgery Date: 08/21/2023  Time of Surgery: 1:30 PM  Where patient plans to recover: At home with family  Fax number for surgical facility: Note does not need to be faxed, will be available electronically in Epic.    Assessment & Plan     The proposed surgical procedure is considered INTERMEDIATE risk.    Preop general physical exam  S/P ORIF (open reduction internal fixation) fracture  Bunion, right  Patient was provided with instruction on preparation for the upcoming procedure. A copy of these instructions were attached to the AVS for the patient to review at home.  - Basic metabolic panel  (Ca, Cl, CO2, Creat, Gluc, K, Na, BUN); Future  - CBC with platelets; Future  - CBC with platelets  - Basic metabolic panel  (Ca, Cl, CO2, Creat, Gluc, K, Na, BUN)    Palpitations  Patient has been managing palpitations with low dose metoprolol. Asymptomatic on current dose of medication. Coronary artery calcium CT from June 2023 unremarkable.     Essential hypertension  BP excellent at 116/64. No changes to medication advised at this time.        - No identified additional risk factors other than previously addressed    Antiplatelet or Anticoagulation Medication Instructions:   - Patient is on no antiplatelet or anticoagulation medications.    Additional Medication Instructions:   - ACE/ARB: HOLD on day of surgery  (minimum 11 hours for general anesthesia).   - Beta Blockers: Continue taking on the day of surgery.   - SSRIs, SNRIs, TCAs, Antipsychotics: Continue without modification.    - rescue Inhaler: Continue PRN. Bring to hospital on the day of surgery.   - Herbal medications and vitamins: HOLD 14 days prior to surgery.    RECOMMENDATION:  APPROVAL GIVEN to proceed with proposed procedure, without further diagnostic evaluation.            Subjective       HPI related to upcoming procedure:   Scheduled for bunion correct surgery and possible hardware removal on 08/21/23 with Dr. Luna.         8/14/2023    11:42 AM   Preop Questions   1. Have you ever had a heart attack or stroke? No   2. Have you ever had surgery on your heart or blood vessels, such as a stent placement, a coronary artery bypass, or surgery on an artery in your head, neck, heart, or legs? No   3. Do you have chest pain with activity? No   4. Do you have a history of  heart failure? No   5. Do you currently have a cold, bronchitis or symptoms of other infection? No   6. Do you have a cough, shortness of breath, or wheezing? No   7. Do you or anyone in your family have previous history of blood clots? No   8. Do you or does anyone in your family have a serious bleeding problem such as prolonged bleeding following surgeries or cuts? No   9. Have you ever had problems with anemia or been told to take iron pills? No   10. Have you had any abnormal blood loss such as black, tarry or bloody stools, or abnormal vaginal bleeding? No   11. Have you ever had a blood transfusion? No   12. Are you willing to have a blood transfusion if it is medically needed before, during, or after your surgery? Yes   13. Have you or any of your relatives ever had problems with anesthesia? No   14. Do you have sleep apnea, excessive snoring or daytime drowsiness? No   15. Do you have any artifical heart valves or other implanted medical devices like a pacemaker, defibrillator, or  continuous glucose monitor? No   16. Do you have artificial joints? No   17. Are you allergic to latex? No   18. Is there any chance that you may be pregnant? No       Health Care Directive:  Patient does not have a Health Care Directive or Living Will: Discussed advance care planning with patient; however, patient declined at this time.    Preoperative Review of :   reviewed - no record of controlled substances prescribed.    Status of Chronic Conditions:  See problem list for active medical problems.  Problems all longstanding and stable, except as noted/documented.  See ROS for pertinent symptoms related to these conditions.    Review of Systems  Constitutional, neuro, ENT, endocrine, pulmonary, cardiac, gastrointestinal, genitourinary, musculoskeletal, integument and psychiatric systems are negative, except as otherwise noted.    Patient Active Problem List    Diagnosis Date Noted    Palpitations 07/27/2020     Priority: Medium    Essential hypertension 07/27/2020     Priority: Medium    Hyperlipidemia LDL goal <100 07/27/2020     Priority: Medium      Past Medical History:   Diagnosis Date    Cancer (H) 1991,93    Elevated BP without diagnosis of hypertension     Hypertension 2019    Uncomplicated asthma 2000    I have had since childhood     Past Surgical History:   Procedure Laterality Date    GYN SURGERY  Cervical    HYSTERECTOMY, PAP NO LONGER INDICATED      XR ANKLE SURGERY MARY LEFT Right      Current Outpatient Medications   Medication Sig Dispense Refill    albuterol (PROAIR HFA/PROVENTIL HFA/VENTOLIN HFA) 108 (90 Base) MCG/ACT inhaler Inhale 2 puffs into the lungs every 6 hours 18 g 0    Cetirizine HCl (ZYRTEC ALLERGY PO)       fluticasone (FLONASE) 50 MCG/ACT nasal spray Spray 1 spray into both nostrils daily      irbesartan-hydrochlorothiazide (AVALIDE) 150-12.5 MG tablet Take 1 tablet by mouth daily 90 tablet 3    metoprolol succinate ER (TOPROL XL) 25 MG 24 hr tablet Take 0.5 tablets (12.5  "mg) by mouth daily 90 tablet 1    multivitamin w/minerals (THERA-VIT-M) tablet Take 1 tablet by mouth daily      omega 3 1000 MG CAPS       traZODone (DESYREL) 50 MG tablet TAKE 1 TO 4 TABLETS BY MOUTH AT BEDTIME 90 tablet 3       Allergies   Allergen Reactions    Lisinopril      cough    Sulfa Antibiotics Difficulty breathing, Nausea and Swelling        Social History     Tobacco Use    Smoking status: Former     Packs/day: 0.00     Years: 1.00     Pack years: 0.00     Types: Cigarettes     Start date: 1991     Quit date: 2005     Years since quittin.0    Smokeless tobacco: Never    Tobacco comments:     rarely smokes, 10/month   Substance Use Topics    Alcohol use: Yes     Comment: occasionally     History   Drug Use Unknown         Objective     /64   Pulse 78   Temp 98.2  F (36.8  C) (Temporal)   Resp 20   Ht 1.651 m (5' 5\")   Wt 62.6 kg (138 lb)   SpO2 100%   BMI 22.96 kg/m      Physical Exam    GENERAL APPEARANCE: healthy, alert and no distress     EYES: EOMI, PERRL     HENT: ear canals and TM's normal and nose and mouth without ulcers or lesions     NECK: no adenopathy, no asymmetry, masses, or scars and thyroid normal to palpation     RESP: lungs clear to auscultation - no rales, rhonchi or wheezes     CV: regular rates and rhythm, normal S1 S2, no S3 or S4 and no murmur, click or rub     MS: extremities normal- no gross deformities noted, no evidence of inflammation in joints, FROM in all extremities.     NEURO: Normal strength and tone, sensory exam grossly normal, mentation intact and speech normal     PSYCH: mentation appears normal. and affect normal/bright     LYMPHATICS: No cervical adenopathy    Recent Labs   Lab Test 22  1523      POTASSIUM 3.8   CR 0.80        Diagnostics:  Recent Results (from the past 24 hour(s))   CBC with platelets    Collection Time: 23  2:59 PM   Result Value Ref Range    WBC Count 8.2 4.0 - 11.0 10e3/uL    RBC Count 4.09 3.80 - " 5.20 10e6/uL    Hemoglobin 13.7 11.7 - 15.7 g/dL    Hematocrit 40.0 35.0 - 47.0 %    MCV 98 78 - 100 fL    MCH 33.5 (H) 26.5 - 33.0 pg    MCHC 34.3 31.5 - 36.5 g/dL    RDW 12.0 10.0 - 15.0 %    Platelet Count 269 150 - 450 10e3/uL      No EKG required, no history of coronary heart disease, significant arrhythmia, peripheral arterial disease or other structural heart disease.    Revised Cardiac Risk Index (RCRI):  The patient has the following serious cardiovascular risks for perioperative complications:   - No serious cardiac risks = 0 points     RCRI Interpretation: 0 points: Class I (very low risk - 0.4% complication rate)         Signed Electronically by: Phil Santana PA-C  Copy of this evaluation report is provided to requesting physician.

## 2023-08-18 NOTE — RESULT ENCOUNTER NOTE
Please fax copy of preop and labs to [facility] Dhillon.     Thanks,  Phil Santana PA-C on 8/18/2023 at 1:17 PM

## 2023-10-19 ENCOUNTER — PATIENT OUTREACH (OUTPATIENT)
Dept: CARE COORDINATION | Facility: CLINIC | Age: 53
End: 2023-10-19

## 2023-11-02 ENCOUNTER — PATIENT OUTREACH (OUTPATIENT)
Dept: CARE COORDINATION | Facility: CLINIC | Age: 53
End: 2023-11-02

## 2023-12-11 ENCOUNTER — HOSPITAL ENCOUNTER (OUTPATIENT)
Dept: GENERAL RADIOLOGY | Facility: CLINIC | Age: 53
Discharge: HOME | End: 2023-12-11
Attending: NURSE PRACTITIONER | Admitting: NURSE PRACTITIONER
Payer: COMMERCIAL

## 2023-12-11 ENCOUNTER — OFFICE VISIT (OUTPATIENT)
Dept: FAMILY MEDICINE | Facility: CLINIC | Age: 53
End: 2023-12-11
Payer: COMMERCIAL

## 2023-12-11 VITALS
OXYGEN SATURATION: 98 % | DIASTOLIC BLOOD PRESSURE: 86 MMHG | WEIGHT: 144 LBS | RESPIRATION RATE: 14 BRPM | BODY MASS INDEX: 23.96 KG/M2 | HEART RATE: 82 BPM | TEMPERATURE: 98 F | SYSTOLIC BLOOD PRESSURE: 138 MMHG

## 2023-12-11 DIAGNOSIS — S96.911A SPRAIN AND STRAIN OF RIGHT ANKLE: Primary | ICD-10-CM

## 2023-12-11 DIAGNOSIS — Z87.81 H/O FRACTURE OF TIBIA: ICD-10-CM

## 2023-12-11 DIAGNOSIS — S93.401A SPRAIN AND STRAIN OF RIGHT ANKLE: Primary | ICD-10-CM

## 2023-12-11 DIAGNOSIS — Z23 NEED FOR VACCINATION: ICD-10-CM

## 2023-12-11 DIAGNOSIS — Z23 NEED FOR PROPHYLACTIC VACCINATION AND INOCULATION AGAINST INFLUENZA: ICD-10-CM

## 2023-12-11 DIAGNOSIS — S99.911A ANKLE INJURY, RIGHT, INITIAL ENCOUNTER: ICD-10-CM

## 2023-12-11 DIAGNOSIS — Z96.9 RETAINED ORTHOPEDIC HARDWARE: ICD-10-CM

## 2023-12-11 PROCEDURE — 90686 IIV4 VACC NO PRSV 0.5 ML IM: CPT | Performed by: NURSE PRACTITIONER

## 2023-12-11 PROCEDURE — 90750 HZV VACC RECOMBINANT IM: CPT | Performed by: NURSE PRACTITIONER

## 2023-12-11 PROCEDURE — 73610 X-RAY EXAM OF ANKLE: CPT | Mod: RT

## 2023-12-11 PROCEDURE — 99214 OFFICE O/P EST MOD 30 MIN: CPT | Mod: 25 | Performed by: NURSE PRACTITIONER

## 2023-12-11 PROCEDURE — 90471 IMMUNIZATION ADMIN: CPT | Performed by: NURSE PRACTITIONER

## 2023-12-11 PROCEDURE — 90472 IMMUNIZATION ADMIN EACH ADD: CPT | Performed by: NURSE PRACTITIONER

## 2023-12-11 ASSESSMENT — PAIN SCALES - GENERAL: PAINLEVEL: EXTREME PAIN (9)

## 2023-12-11 ASSESSMENT — ASTHMA QUESTIONNAIRES: ACT_TOTALSCORE: 25

## 2023-12-11 NOTE — PROGRESS NOTES
Appropriate assistive devices provided during their visit. yes (Yes, No, N/A) wheelchair (list device)    Exam table and/or cart  placed in the lowest position. yes (Yes, No, N/A)    Brakes on tables/carts/wheelchairs used at all times. yes (Yes, No, N/A)    Non slip footwear applied. No shoes on (Yes, No, NA)    Patient was accompanied by staff throughout visit. yes (Yes, No, N/A)    Equipment safety straps used. na (Yes, No, N/A)    Assist with toileting. na (Yes, No, N/A)

## 2023-12-11 NOTE — PROGRESS NOTES
Assessment & Plan     Sprain and strain of right ankle    Ankle placed in brace in clinic today for support. She has crutches at home she can use as needed. Note provided for work this week. Handicap parking also signed per patient request.  Apply ice to effected area for 15-20 minutes at least 3-4 times daily  Take ibuprofen (Advil or Motrin) 600mg every 6-8 hours or naproxen (Aleve) 2 tablets twice daily for 5-7 days or as needed for pain   Elevate as able with pillows   Perform range of motion exercises 4 times daily     Discussed typical healing time for an ankle sprain can take up to 6 weeks. She will follow-up if she is not having any improvement in her symptoms, or with any new or worsening symptoms, questions or concerns.       Ankle injury, right, initial encounter  - XR Ankle Right G/E 3 Views; Future    H/O fracture of tibia    Retained orthopedic hardware    Need for vaccination    Need for prophylactic vaccination and inoculation against influenza    I explained my diagnostic considerations and recommendations to the patient, who voiced understanding and agreement with the assessment and treatment plan. All questions were answered to patient's apparent satisfaction. We discussed potential side effects of any prescribed or recommended therapies, as well as expectations for response to treatments and importance of lifestyle measures that may improve symptoms. Patient was advised to contact our office if there are new symptoms or no improvement or worsening of conditions or symptoms.    Greater than 30 minutes were spent today with more than 50% dedicated to counseling and coordination of care of the above mentioned problems.                   Rekha Escobedo NP  [facility] NADINE Valencia is a 53 year old, presenting for the following health issues:  Ankle Pain      12/11/2023    10:29 AM   Additional Questions   Roomed by Lia Morris       History of Present Illness  "      Reason for visit:  Ankle re-injury  Symptom onset:  1-3 days ago  Symptoms include:  Swollen painful Ankle  Symptom intensity:  Severe  Symptom progression:  Staying the same  Had these symptoms before:  No  What makes it worse:  Weight bearing  What makes it better:  Ice    She eats 2-3 servings of fruits and vegetables daily.She consumes 0 sweetened beverage(s) daily.She exercises with enough effort to increase her heart rate 30 to 60 minutes per day.  She exercises with enough effort to increase her heart rate 3 or less days per week.   She is taking medications regularly.     Annie presents today with concerns of pain in her right ankle. She has a history of a previous right tib/fib fracture. She underwent hardware removal and bunionectomy on the right in August, 2023. Two days ago, she was walking outside when she stepped in a hole and rolled her right ankle. She denies noticing any \"pop\" or \"click\" with the injury. She has since had pain in the medial and anterior right ankle. She has developed swelling in the ankle and toe as well. She rates the pain 9/10 when walking, throbbing at rest but much improved with ice and elevation, 6/10. She denies any numbness or tingling in the ankle or foot.     Patient Active Problem List   Diagnosis    Palpitations    Essential hypertension    Hyperlipidemia LDL goal <100    Anxiety state    Insomnia, unspecified    Reactive airway disease    Tibia/fibula fracture    Allergic rhinitis     Current Outpatient Medications   Medication    albuterol (PROAIR HFA/PROVENTIL HFA/VENTOLIN HFA) 108 (90 Base) MCG/ACT inhaler    Cetirizine HCl (ZYRTEC ALLERGY PO)    fluticasone (FLONASE) 50 MCG/ACT nasal spray    irbesartan-hydrochlorothiazide (AVALIDE) 150-12.5 MG tablet    metoprolol succinate ER (TOPROL XL) 25 MG 24 hr tablet    multivitamin w/minerals (THERA-VIT-M) tablet    omega 3 1000 MG CAPS    traZODone (DESYREL) 50 MG tablet     No current facility-administered " medications for this visit.       Review of Systems   Constitutional, HEENT, cardiovascular, pulmonary, gi and gu systems are negative, except as otherwise noted.      Objective    /86   Pulse 82   Temp 98  F (36.7  C) (Temporal)   Resp 14   Wt 65.3 kg (144 lb)   SpO2 98%   BMI 23.96 kg/m    Body mass index is 23.96 kg/m .  Physical Exam  Vitals reviewed.   Constitutional:       General: She is not in acute distress.     Appearance: Normal appearance.   HENT:      Head: Normocephalic and atraumatic.   Pulmonary:      Effort: Pulmonary effort is normal.   Musculoskeletal:      Right ankle: Swelling present. No deformity or ecchymosis. Tenderness present over the lateral malleolus and medial malleolus. Decreased range of motion. Normal pulse.      Right Achilles Tendon: Normal.      Comments: Edema present in the right ankle, extending into the right foot, most prominent on the right lateral ankle. Tenderness with palpation across the right ankle. Decreased ROM in all directions due to pain    Skin:     General: Skin is warm and dry.      Capillary Refill: Capillary refill takes less than 2 seconds.   Neurological:      Mental Status: She is alert and oriented to person, place, and time.      Gait: Gait abnormal.      Comments: Due to pain   Psychiatric:         Mood and Affect: Mood normal.         Behavior: Behavior normal.            XR Ankle Right G/E 3 Views    Result Date: 12/11/2023  XR ANKLE RIGHT G/E 3 VIEWS 12/11/2023 11:18 AM HISTORY: Pain after injury COMPARISON: None.     IMPRESSION: Postoperative changes of plate and screw fixation of an old healed fracture distal tibia. This is in good alignment. Old healed fracture of the fibula. Soft tissue swelling about the ankle. No evidence for acute fracture or disruption of the ankle mortise. DAGMAR LOPEZ MD   SYSTEM ID:  [id number]

## 2023-12-11 NOTE — LETTER
December 11, 2023      Dot Osborn  [address]  [facility] [place] [address]        To Whom It May Concern:    Dot Osborn  was seen on 12/11/23.  Please excuse her  until 12/18/23 due to injury.        Sincerely,        Rekha Escobedo NP

## 2024-01-04 ENCOUNTER — MYC MEDICAL ADVICE (OUTPATIENT)
Dept: OTOLARYNGOLOGY | Facility: CLINIC | Age: 54
End: 2024-01-04
Payer: COMMERCIAL

## 2024-01-06 ENCOUNTER — HEALTH MAINTENANCE LETTER (OUTPATIENT)
Age: 54
End: 2024-01-06

## 2024-01-10 ENCOUNTER — DOCUMENTATION ONLY (OUTPATIENT)
Dept: HOME HEALTH SERVICES | Facility: CLINIC | Age: 54
End: 2024-01-10
Payer: COMMERCIAL

## 2024-01-10 DIAGNOSIS — S93.491A SPRAIN OF ANTERIOR TALOFIBULAR LIGAMENT OF RIGHT ANKLE, INITIAL ENCOUNTER: Primary | ICD-10-CM

## 2024-01-10 DIAGNOSIS — S96.811A STRAIN OF OTHER SPECIFIED MUSCLES AND TENDONS AT ANKLE AND FOOT LEVEL, RIGHT FOOT, INITIAL ENCOUNTER: ICD-10-CM

## 2024-01-11 ENCOUNTER — TELEPHONE (OUTPATIENT)
Dept: FAMILY MEDICINE | Facility: CLINIC | Age: 54
End: 2024-01-11
Payer: COMMERCIAL

## 2024-01-11 NOTE — TELEPHONE ENCOUNTER
Routing to Speciality. Marya Santiago LPN    General Call    Contacts         Type Contact Phone/Fax    01/11/2024 10:58 AM CST Phone (Incoming) Annie Osborn (Self) [phone] (M)          Reason for Call:  Needing a scooter due to FX    What are your questions or concerns:  Patient calling to state she had went back to her surgeon due to the increasing pain to right ankle trying to walk on it. She was informed that she has a FX.  Patient was given an order/script from her Ortho surgeon for a scooter.  She is calling to see if she can get this here at [place]?    Date of last appointment with provider: 12/11/23 seen [facility] for her ankle injury    Could we send this information to you in [email or web address] [place] or would you prefer to receive a phone call?:   Patient would prefer a phone call   Okay to leave a detailed message?: Yes at Cell number on file:  SHE WILL BE AVAILABLE BY HER CELL PHONE ANYTIME AFTER 11:15 TODAY.  Telephone Information:   Mobile [phone]

## 2024-01-11 NOTE — TELEPHONE ENCOUNTER
Called and spoke with patient, she was advised that order could be faxed to [facility] at [phone]. Their phone number was provided as well. Patient verbalized understanding and has no further questions at this time.     Carola Lugo RN   [facility]

## 2024-01-17 ENCOUNTER — HOSPITAL ENCOUNTER (OUTPATIENT)
Dept: MAMMOGRAPHY | Facility: CLINIC | Age: 54
Discharge: HOME | End: 2024-01-17
Attending: PHYSICIAN ASSISTANT | Admitting: PHYSICIAN ASSISTANT
Payer: COMMERCIAL

## 2024-01-17 DIAGNOSIS — Z12.31 VISIT FOR SCREENING MAMMOGRAM: ICD-10-CM

## 2024-01-17 PROCEDURE — 77063 BREAST TOMOSYNTHESIS BI: CPT

## 2024-01-29 ENCOUNTER — TRANSFERRED RECORDS (OUTPATIENT)
Dept: HEALTH INFORMATION MANAGEMENT | Facility: CLINIC | Age: 54
End: 2024-01-29
Payer: COMMERCIAL

## 2024-05-14 ENCOUNTER — OFFICE VISIT (OUTPATIENT)
Dept: FAMILY MEDICINE | Facility: CLINIC | Age: 54
End: 2024-05-14
Payer: COMMERCIAL

## 2024-05-14 VITALS
WEIGHT: 137.38 LBS | HEIGHT: 65 IN | HEART RATE: 68 BPM | SYSTOLIC BLOOD PRESSURE: 108 MMHG | RESPIRATION RATE: 16 BRPM | BODY MASS INDEX: 22.89 KG/M2 | DIASTOLIC BLOOD PRESSURE: 60 MMHG | TEMPERATURE: 97.2 F | OXYGEN SATURATION: 99 %

## 2024-05-14 DIAGNOSIS — E34.9 HORMONE IMBALANCE: ICD-10-CM

## 2024-05-14 DIAGNOSIS — Z78.0 ASYMPTOMATIC POSTMENOPAUSAL STATUS: ICD-10-CM

## 2024-05-14 DIAGNOSIS — Z00.00 ROUTINE GENERAL MEDICAL EXAMINATION AT A HEALTH CARE FACILITY: Primary | ICD-10-CM

## 2024-05-14 DIAGNOSIS — R00.2 PALPITATIONS: ICD-10-CM

## 2024-05-14 DIAGNOSIS — E78.5 HYPERLIPIDEMIA LDL GOAL <100: ICD-10-CM

## 2024-05-14 DIAGNOSIS — I10 ESSENTIAL HYPERTENSION: ICD-10-CM

## 2024-05-14 DIAGNOSIS — G47.00 INSOMNIA, UNSPECIFIED TYPE: ICD-10-CM

## 2024-05-14 LAB
ANION GAP SERPL CALCULATED.3IONS-SCNC: 11 MMOL/L (ref 7–15)
BUN SERPL-MCNC: 26.3 MG/DL (ref 6–20)
CALCIUM SERPL-MCNC: 9.7 MG/DL (ref 8.6–10)
CHLORIDE SERPL-SCNC: 99 MMOL/L (ref 98–107)
CHOLEST SERPL-MCNC: 218 MG/DL
CREAT SERPL-MCNC: 1.09 MG/DL (ref 0.51–0.95)
DEPRECATED HCO3 PLAS-SCNC: 28 MMOL/L (ref 22–29)
EGFRCR SERPLBLD CKD-EPI 2021: 60 ML/MIN/1.73M2
FASTING STATUS PATIENT QL REPORTED: NO
FASTING STATUS PATIENT QL REPORTED: NO
GLUCOSE SERPL-MCNC: 97 MG/DL (ref 70–99)
HDLC SERPL-MCNC: 74 MG/DL
LDLC SERPL CALC-MCNC: 85 MG/DL
NONHDLC SERPL-MCNC: 144 MG/DL
POTASSIUM SERPL-SCNC: 3.9 MMOL/L (ref 3.4–5.3)
SODIUM SERPL-SCNC: 138 MMOL/L (ref 135–145)
TRIGL SERPL-MCNC: 295 MG/DL

## 2024-05-14 PROCEDURE — 82670 ASSAY OF TOTAL ESTRADIOL: CPT | Performed by: PHYSICIAN ASSISTANT

## 2024-05-14 PROCEDURE — 80048 BASIC METABOLIC PNL TOTAL CA: CPT | Performed by: PHYSICIAN ASSISTANT

## 2024-05-14 PROCEDURE — 99396 PREV VISIT EST AGE 40-64: CPT | Mod: 25 | Performed by: PHYSICIAN ASSISTANT

## 2024-05-14 PROCEDURE — 80061 LIPID PANEL: CPT | Performed by: PHYSICIAN ASSISTANT

## 2024-05-14 PROCEDURE — 84144 ASSAY OF PROGESTERONE: CPT | Performed by: PHYSICIAN ASSISTANT

## 2024-05-14 PROCEDURE — 90715 TDAP VACCINE 7 YRS/> IM: CPT | Performed by: PHYSICIAN ASSISTANT

## 2024-05-14 PROCEDURE — 99214 OFFICE O/P EST MOD 30 MIN: CPT | Mod: 25 | Performed by: PHYSICIAN ASSISTANT

## 2024-05-14 PROCEDURE — 90471 IMMUNIZATION ADMIN: CPT | Performed by: PHYSICIAN ASSISTANT

## 2024-05-14 PROCEDURE — 84403 ASSAY OF TOTAL TESTOSTERONE: CPT | Performed by: PHYSICIAN ASSISTANT

## 2024-05-14 PROCEDURE — 36415 COLL VENOUS BLD VENIPUNCTURE: CPT | Performed by: PHYSICIAN ASSISTANT

## 2024-05-14 PROCEDURE — 83001 ASSAY OF GONADOTROPIN (FSH): CPT | Performed by: PHYSICIAN ASSISTANT

## 2024-05-14 RX ORDER — IRBESARTAN AND HYDROCHLOROTHIAZIDE 150; 12.5 MG/1; MG/1
1 TABLET, FILM COATED ORAL DAILY
Qty: 90 TABLET | Refills: 3 | Status: SHIPPED | OUTPATIENT
Start: 2024-05-14

## 2024-05-14 RX ORDER — TRAZODONE HYDROCHLORIDE 50 MG/1
TABLET, FILM COATED ORAL
Qty: 90 TABLET | Refills: 3 | Status: SHIPPED | OUTPATIENT
Start: 2024-05-14 | End: 2024-08-26

## 2024-05-14 RX ORDER — METOPROLOL SUCCINATE 25 MG/1
12.5 TABLET, EXTENDED RELEASE ORAL DAILY
Qty: 45 TABLET | Refills: 3 | Status: SHIPPED | OUTPATIENT
Start: 2024-05-14

## 2024-05-14 SDOH — HEALTH STABILITY: PHYSICAL HEALTH: ON AVERAGE, HOW MANY DAYS PER WEEK DO YOU ENGAGE IN MODERATE TO STRENUOUS EXERCISE (LIKE A BRISK WALK)?: 3 DAYS

## 2024-05-14 SDOH — HEALTH STABILITY: PHYSICAL HEALTH: ON AVERAGE, HOW MANY MINUTES DO YOU ENGAGE IN EXERCISE AT THIS LEVEL?: 30 MIN

## 2024-05-14 ASSESSMENT — ASTHMA QUESTIONNAIRES
QUESTION_4 LAST FOUR WEEKS HOW OFTEN HAVE YOU USED YOUR RESCUE INHALER OR NEBULIZER MEDICATION (SUCH AS ALBUTEROL): NOT AT ALL
ACT_TOTALSCORE: 25
QUESTION_3 LAST FOUR WEEKS HOW OFTEN DID YOUR ASTHMA SYMPTOMS (WHEEZING, COUGHING, SHORTNESS OF BREATH, CHEST TIGHTNESS OR PAIN) WAKE YOU UP AT NIGHT OR EARLIER THAN USUAL IN THE MORNING: NOT AT ALL
QUESTION_1 LAST FOUR WEEKS HOW MUCH OF THE TIME DID YOUR ASTHMA KEEP YOU FROM GETTING AS MUCH DONE AT WORK, SCHOOL OR AT HOME: NONE OF THE TIME
QUESTION_2 LAST FOUR WEEKS HOW OFTEN HAVE YOU HAD SHORTNESS OF BREATH: NOT AT ALL
ACT_TOTALSCORE: 25
QUESTION_5 LAST FOUR WEEKS HOW WOULD YOU RATE YOUR ASTHMA CONTROL: COMPLETELY CONTROLLED

## 2024-05-14 ASSESSMENT — SOCIAL DETERMINANTS OF HEALTH (SDOH): HOW OFTEN DO YOU GET TOGETHER WITH FRIENDS OR RELATIVES?: ONCE A WEEK

## 2024-05-14 NOTE — PROGRESS NOTES
Preventive Care Visit  Formerly [facility]  Tricia Hernandez PA-C, Family Medicine  May 14, 2024        Ghislaine Valencia is a 53 year old, presenting for the following:  Physical        5/14/2024     3:39 PM   Additional Questions   Roomed by Sherly ALEXIS MA        Health Care Directive  Patient does not have a Health Care Directive or Living Will: Discussed advance care planning with patient; information given to patient to review.    HPI    Patient presents today for follow-up of blood pressure. Numbers have been well controlled. Tolerating medications well without side effects. Monitoring salt in diet. Patient denies headaches, vision changes, chest pain, shortness of breath or paresthesias.    Uses Trazodone as needed. This has been helpful for sleep.     Interested in checking hormones.     [place] .         5/14/2024   General Health   How would you rate your overall physical health? Good   Feel stress (tense, anxious, or unable to sleep) Not at all         5/14/2024   Nutrition   Three or more servings of calcium each day? Yes   Diet: Regular (no restrictions)    Low fat/cholesterol    Carbohydrate counting   How many servings of fruit and vegetables per day? (!) 0-1   How many sweetened beverages each day? (!) 2         5/14/2024   Exercise   Days per week of moderate/strenous exercise 3 days   Average minutes spent exercising at this level 30 min         5/14/2024   Social Factors   Frequency of gathering with friends or relatives Once a week   Worry food won't last until get money to buy more No   Food not last or not have enough money for food? No   Do you have housing?  Yes   Are you worried about losing your housing? No   Lack of transportation? No   Unable to get utilities (heat,electricity)? No         5/14/2024   Fall Risk   Fallen 2 or more times in the past year? No   Trouble with walking or balance? No           2024   Dental   Dentist two times every year? Yes         2024   TB Screening   Were you born outside of the [place]? No         Today's PHQ-2 Score:       2024     3:38 PM   PHQ-2 (  [employer])   Q1: Little interest or pleasure in doing things 0   Q2: Feeling down, depressed or hopeless 0   PHQ-2 Score 0   Q1: Little interest or pleasure in doing things Not at all   Q2: Feeling down, depressed or hopeless Not at all   PHQ-2 Score 0           2024   Substance Use   Alcohol more than 3/day or more than 7/wk Not Applicable   Do you use any other substances recreationally? No     Social History     Tobacco Use    Smoking status: Former     Current packs/day: 0.00     Types: Cigarettes     Start date: 1991     Quit date: 2005     Years since quittin.7    Smokeless tobacco: Never    Tobacco comments:     rarely smokes, 10/month   Vaping Use    Vaping status: Never Used   Substance Use Topics    Alcohol use: Yes     Comment: occasionally    Drug use: Never           2024   LAST FHS-7 RESULTS   1st degree relative breast or ovarian cancer Yes   Any relative bilateral breast cancer No   Any male have breast cancer No   Any ONE woman have BOTH breast AND ovarian cancer No   Any woman with breast cancer before 50yrs No   2 or more relatives with breast AND/OR ovarian cancer Yes   2 or more relatives with breast AND/OR bowel cancer No        Mammogram Screening - Mammogram every 1-2 years updated in Health Maintenance based on mutual decision making        2024   STI Screening   New sexual partner(s) since last STI/HIV test? No     History of abnormal Pap smear: No - age 30- 64 PAP with HPV every 5 years recommended       ASCVD Risk   The 10-year ASCVD risk score (Celina QUIROGA, et al., 2019) is: 1.4%    Values used to calculate the score:      Age: 53 years      Sex: Female      Is Non- : No      Diabetic: No      Tobacco smoker: No      Systolic Blood  Pressure: 108 mmHg      Is BP treated: Yes      HDL Cholesterol: 71 mg/dL      Total Cholesterol: 186 mg/dL         Reviewed and updated as needed this visit by Provider                    BP Readings from Last 3 Encounters:   24 108/60   23 138/86   23 116/64    Wt Readings from Last 3 Encounters:   24 62.3 kg (137 lb 6 oz)   23 65.3 kg (144 lb)   23 62.6 kg (138 lb)                  Patient Active Problem List   Diagnosis    Palpitations    Essential hypertension    Hyperlipidemia LDL goal <100    Anxiety state    Insomnia, unspecified    Reactive airway disease    Tibia/fibula fracture    Allergic rhinitis     Past Surgical History:   Procedure Laterality Date    GYN SURGERY  Cervical    HYSTERECTOMY, PAP NO LONGER INDICATED      XR ANKLE SURGERY MARY LEFT Right        Social History     Tobacco Use    Smoking status: Former     Current packs/day: 0.00     Types: Cigarettes     Start date: 1991     Quit date: 2005     Years since quittin.8    Smokeless tobacco: Never    Tobacco comments:     rarely smokes, 10/month   Substance Use Topics    Alcohol use: Yes     Comment: occasionally     Family History   Problem Relation Age of Onset    Coronary Artery Disease Maternal Grandmother          Current Outpatient Medications   Medication Sig Dispense Refill    albuterol (PROAIR HFA/PROVENTIL HFA/VENTOLIN HFA) 108 (90 Base) MCG/ACT inhaler Inhale 2 puffs into the lungs every 6 hours 18 g 0    Cetirizine HCl (ZYRTEC ALLERGY PO)       fluticasone (FLONASE) 50 MCG/ACT nasal spray Spray 1 spray into both nostrils daily      irbesartan-hydrochlorothiazide (AVALIDE) 150-12.5 MG tablet Take 1 tablet by mouth daily 90 tablet 3    metoprolol succinate ER (TOPROL XL) 25 MG 24 hr tablet Take 0.5 tablets (12.5 mg) by mouth daily 45 tablet 3    multivitamin w/minerals (THERA-VIT-M) tablet Take 1 tablet by mouth daily      omega 3 1000 MG CAPS       ondansetron (ZOFRAN) 4 MG tablet  "Take 1 tablet (4 mg) by mouth every 8 hours as needed for nausea 20 tablet 2    traZODone (DESYREL) 50 MG tablet TAKE 1 TO 4 TABLETS BY MOUTH AT BEDTIME 90 tablet 3         Review of Systems  Constitutional, HEENT, cardiovascular, pulmonary, GI, , musculoskeletal, neuro, skin, endocrine and psych systems are negative, except as otherwise noted.     Objective    Exam  /60   Pulse 68   Temp 97.2  F (36.2  C) (Temporal)   Resp 16   Ht 1.651 m (5' 5\")   Wt 62.3 kg (137 lb 6 oz)   SpO2 99%   BMI 22.86 kg/m     Estimated body mass index is 22.86 kg/m  as calculated from the following:    Height as of this encounter: 1.651 m (5' 5\").    Weight as of this encounter: 62.3 kg (137 lb 6 oz).    Physical Exam  GENERAL: alert and no distress  EYES: Eyes grossly normal to inspection, PERRL and conjunctivae and sclerae normal  HENT: ear canals and TM's normal, nose and mouth without ulcers or lesions  NECK: no adenopathy, no asymmetry, masses, or scars  RESP: lungs clear to auscultation - no rales, rhonchi or wheezes  CV: regular rate and rhythm, normal S1 S2, no S3 or S4, no murmur, click or rub, no peripheral edema  ABDOMEN: soft, nontender, no hepatosplenomegaly, no masses and bowel sounds normal  MS: no gross musculoskeletal defects noted, no edema  SKIN: no suspicious lesions or rashes  NEURO: Normal strength and tone, mentation intact and speech normal  PSYCH: mentation appears normal, affect normal/bright    Assessment & Plan     Routine general medical examination at a health care facility  Vaccinations reviewed and declined at this time.    Hyperlipidemia LDL goal <100  - Lipid panel reflex to direct LDL Non-fasting; Future  - Lipid panel reflex to direct LDL Non-fasting    Essential hypertension  Stable. Continue current treatment without change.   - irbesartan-hydrochlorothiazide (AVALIDE) 150-12.5 MG tablet; Take 1 tablet by mouth daily  - Basic metabolic panel  (Ca, Cl, CO2, Creat, Gluc, K, Na, BUN); " Future  - Basic metabolic panel  (Ca, Cl, CO2, Creat, Gluc, K, Na, BUN)    Palpitations  Stable. Continue current treatment without change.   - metoprolol succinate ER (TOPROL XL) 25 MG 24 hr tablet; Take 0.5 tablets (12.5 mg) by mouth daily    Insomnia, unspecified type  - traZODone (DESYREL) 50 MG tablet; TAKE 1 TO 4 TABLETS BY MOUTH AT BEDTIME    Asymptomatic postmenopausal status  - DX Bone Density; Future    Hormone imbalance  - Follicle stimulating hormone; Future  - Estradiol; Future  - Progesterone; Future  - Testosterone, total; Future  - Follicle stimulating hormone  - Estradiol  - Progesterone  - Testosterone, total    Patient has been advised of split billing requirements and indicates understanding: Yes        Counseling  Appropriate preventive services were discussed with this patient, including applicable screening as appropriate for fall prevention, nutrition, physical activity, Tobacco-use cessation, weight loss and cognition.  Checklist reviewing preventive services available has been given to the patient.  Reviewed patient's diet, addressing concerns and/or questions.   She is at risk for lack of exercise and has been provided with information to increase physical activity for the benefit of her well-being.       Signed Electronically by: Tricia Hernandez PA-C

## 2024-05-15 LAB
ESTRADIOL SERPL-MCNC: <5 PG/ML
FSH SERPL IRP2-ACNC: 97.5 MIU/ML
PROGEST SERPL-MCNC: 0.2 NG/ML

## 2024-05-16 LAB — TESTOST SERPL-MCNC: 10 NG/DL (ref 8–60)

## 2024-06-10 ENCOUNTER — HOSPITAL ENCOUNTER (OUTPATIENT)
Dept: BONE DENSITY | Facility: CLINIC | Age: 54
Discharge: HOME | End: 2024-06-10
Attending: PHYSICIAN ASSISTANT | Admitting: PHYSICIAN ASSISTANT
Payer: COMMERCIAL

## 2024-06-10 DIAGNOSIS — Z78.0 ASYMPTOMATIC POSTMENOPAUSAL STATUS: ICD-10-CM

## 2024-06-10 PROCEDURE — 77080 DXA BONE DENSITY AXIAL: CPT

## 2024-08-25 DIAGNOSIS — G47.00 INSOMNIA, UNSPECIFIED TYPE: ICD-10-CM

## 2024-08-26 RX ORDER — TRAZODONE HYDROCHLORIDE 50 MG/1
TABLET, FILM COATED ORAL
Qty: 270 TABLET | Refills: 1 | Status: SHIPPED | OUTPATIENT
Start: 2024-08-26

## 2024-10-22 ENCOUNTER — OFFICE VISIT (OUTPATIENT)
Dept: ALLERGY | Facility: OTHER | Age: 54
End: 2024-10-22
Payer: COMMERCIAL

## 2024-10-22 ENCOUNTER — MYC MEDICAL ADVICE (OUTPATIENT)
Dept: FAMILY MEDICINE | Facility: CLINIC | Age: 54
End: 2024-10-22

## 2024-10-22 VITALS
WEIGHT: 140.8 LBS | DIASTOLIC BLOOD PRESSURE: 76 MMHG | BODY MASS INDEX: 22.63 KG/M2 | HEART RATE: 65 BPM | OXYGEN SATURATION: 98 % | HEIGHT: 66 IN | SYSTOLIC BLOOD PRESSURE: 115 MMHG

## 2024-10-22 DIAGNOSIS — R10.11 RUQ ABDOMINAL PAIN: ICD-10-CM

## 2024-10-22 DIAGNOSIS — R06.2 WHEEZING: ICD-10-CM

## 2024-10-22 DIAGNOSIS — J32.8 OTHER CHRONIC SINUSITIS: ICD-10-CM

## 2024-10-22 DIAGNOSIS — J30.0 CHRONIC VASOMOTOR RHINITIS: Primary | ICD-10-CM

## 2024-10-22 LAB
BASOPHILS # BLD AUTO: 0 10E3/UL (ref 0–0.2)
BASOPHILS NFR BLD AUTO: 0 %
EOSINOPHIL # BLD AUTO: 0.2 10E3/UL (ref 0–0.7)
EOSINOPHIL NFR BLD AUTO: 3 %
ERYTHROCYTE [DISTWIDTH] IN BLOOD BY AUTOMATED COUNT: 12.6 % (ref 10–15)
FEF 25/75: NORMAL
FEV-1: NORMAL
FEV1/FVC: NORMAL
FVC: NORMAL
HCT VFR BLD AUTO: 35.7 % (ref 35–47)
HGB BLD-MCNC: 12.1 G/DL (ref 11.7–15.7)
IMM GRANULOCYTES # BLD: 0 10E3/UL
IMM GRANULOCYTES NFR BLD: 0 %
LYMPHOCYTES # BLD AUTO: 3.5 10E3/UL (ref 0.8–5.3)
LYMPHOCYTES NFR BLD AUTO: 48 %
MCH RBC QN AUTO: 32.1 PG (ref 26.5–33)
MCHC RBC AUTO-ENTMCNC: 33.9 G/DL (ref 31.5–36.5)
MCV RBC AUTO: 95 FL (ref 78–100)
MONOCYTES # BLD AUTO: 0.5 10E3/UL (ref 0–1.3)
MONOCYTES NFR BLD AUTO: 7 %
NEUTROPHILS # BLD AUTO: 3 10E3/UL (ref 1.6–8.3)
NEUTROPHILS NFR BLD AUTO: 42 %
PLATELET # BLD AUTO: 235 10E3/UL (ref 150–450)
RBC # BLD AUTO: 3.77 10E6/UL (ref 3.8–5.2)
WBC # BLD AUTO: 7.2 10E3/UL (ref 4–11)

## 2024-10-22 PROCEDURE — 86357 NK CELLS TOTAL COUNT: CPT | Performed by: ALLERGY & IMMUNOLOGY

## 2024-10-22 PROCEDURE — 36415 COLL VENOUS BLD VENIPUNCTURE: CPT | Performed by: ALLERGY & IMMUNOLOGY

## 2024-10-22 PROCEDURE — 80053 COMPREHEN METABOLIC PANEL: CPT | Performed by: ALLERGY & IMMUNOLOGY

## 2024-10-22 PROCEDURE — 86355 B CELLS TOTAL COUNT: CPT | Performed by: ALLERGY & IMMUNOLOGY

## 2024-10-22 PROCEDURE — 86317 IMMUNOASSAY INFECTIOUS AGENT: CPT | Mod: 90 | Performed by: ALLERGY & IMMUNOLOGY

## 2024-10-22 PROCEDURE — 94010 BREATHING CAPACITY TEST: CPT | Performed by: ALLERGY & IMMUNOLOGY

## 2024-10-22 PROCEDURE — 86359 T CELLS TOTAL COUNT: CPT | Performed by: ALLERGY & IMMUNOLOGY

## 2024-10-22 PROCEDURE — 99204 OFFICE O/P NEW MOD 45 MIN: CPT | Mod: 25 | Performed by: ALLERGY & IMMUNOLOGY

## 2024-10-22 PROCEDURE — 82784 ASSAY IGA/IGD/IGG/IGM EACH: CPT | Performed by: ALLERGY & IMMUNOLOGY

## 2024-10-22 PROCEDURE — 82784 ASSAY IGA/IGD/IGG/IGM EACH: CPT | Mod: 91 | Performed by: ALLERGY & IMMUNOLOGY

## 2024-10-22 PROCEDURE — 86003 ALLG SPEC IGE CRUDE XTRC EA: CPT | Performed by: ALLERGY & IMMUNOLOGY

## 2024-10-22 PROCEDURE — 85025 COMPLETE CBC W/AUTO DIFF WBC: CPT | Performed by: ALLERGY & IMMUNOLOGY

## 2024-10-22 PROCEDURE — 82785 ASSAY OF IGE: CPT | Performed by: ALLERGY & IMMUNOLOGY

## 2024-10-22 PROCEDURE — 86162 COMPLEMENT TOTAL (CH50): CPT | Mod: 90 | Performed by: ALLERGY & IMMUNOLOGY

## 2024-10-22 PROCEDURE — 86360 T CELL ABSOLUTE COUNT/RATIO: CPT | Performed by: ALLERGY & IMMUNOLOGY

## 2024-10-22 PROCEDURE — 99000 SPECIMEN HANDLING OFFICE-LAB: CPT | Performed by: ALLERGY & IMMUNOLOGY

## 2024-10-22 RX ORDER — BUDESONIDE AND FORMOTEROL FUMARATE DIHYDRATE 80; 4.5 UG/1; UG/1
2 AEROSOL RESPIRATORY (INHALATION) EVERY 4 HOURS PRN
Qty: 10.2 G | Refills: 3 | Status: SHIPPED | OUTPATIENT
Start: 2024-10-22

## 2024-10-22 RX ORDER — BUDESONIDE 0.5 MG/2ML
INHALANT ORAL
Qty: 120 ML | Refills: 2 | Status: SHIPPED | OUTPATIENT
Start: 2024-10-22

## 2024-10-22 RX ORDER — AZELASTINE 1 MG/ML
2 SPRAY, METERED NASAL 2 TIMES DAILY PRN
Qty: 30 ML | Refills: 3 | Status: SHIPPED | OUTPATIENT
Start: 2024-10-22

## 2024-10-22 ASSESSMENT — ASTHMA QUESTIONNAIRES: ACT_TOTALSCORE: 25

## 2024-10-22 NOTE — PATIENT INSTRUCTIONS
- Use Symbicort 80/4.5 mcg inhaler 2 puffs every 4 hours as needed for chest tightness/wheezing/shortness of breath/persistent cough. Use it with a spacer/chamber device. Rinse/gargle/spit water after use.    Get the bloodwork done.     Stop Flonase.   Start budesonide/saline irrigations twice daily.   -Use azelastine 2 sprays in each nostril twice a day when necessary.       Dr Iglesias [phone]    All visits for food challenges, venom allergy testing, and medication/drug challenges MUST be scheduled through the allergy clinic nurse. Please send a "[employer]" message or call the allergy scheduling line and ask to speak with Dr Iglesias's team for scheduling these appointments. Appointments for these visits that are made through the schedulers or via "[employer]" may be cancelled or rescheduled.    Allergy Shot Room [facility]): [phone]    Pulmonary Function [phone]    Prescription Assistance  If you need assistance with your prescriptions (cost, coverage, etc) please contact: [place] [facility] [phone]

## 2024-10-22 NOTE — PROGRESS NOTES
SUBJECTIVE:                                                                 Dot Osborn presents today to our Allergy Clinic at [facility] for a new patient visit. She is a 54 year old female with concerns for environmental/seasonal allergies, possible asthma, and current sinus infections.  The patient has a longstanding history of frequent sinus infections, spanning more than 15 years. She underwent sinus surgery 15 years ago but reports minimal relief from her symptoms. She experiences persistent sinus issues characterized by nasal congestion, postnasal drainage, and sinus headaches. During sinus infections, she notes yellow and green postnasal drainage as well as rhinorrhea. Even between infections, she continues to experience nasal congestion and postnasal drainage. Her symptoms follow a perennial pattern without significant seasonal variations and are triggered by exposure to cats, dust, mowing grass, raking leaves, and respiratory infections.    The patient reports having approximately 4-6 sinus infections annually, which are typically treated with antibiotics and occasionally prednisone. She finds Augmentin effective, whereas azithromycin provides less relief. She denies any issues with her sense of smell. For management, she uses intranasal fluticasone, saline irrigations, and oxymetazoline as needed, with an awareness not to use oxymetazoline for more than three consecutive days. A sinus CT performed in March 2023 showed deviated nasal septum, postoperative changes and evidence of chronic sinus disease.  I personally reviewed and interpreted the results of the sinus CT.  She has previously consulted Dr. Felix, who recommended surgery, but she prefers to avoid another procedure if possible.    Additionally, the patient has a history of asthma since adolescence, presenting with wheezing, chest tightness, and coughing. Her symptoms are triggered by respiratory infections  and exposure to cats or horses. She finds that her albuterol inhaler provides relief within 5 to 10 minutes. She has not required prednisone treatment in the past year and has no history of asthma-related hospitalizations. Her use of albuterol as a rescue medication is less than twice weekly.  Patient Active Problem List   Diagnosis    Palpitations    Essential hypertension    Hyperlipidemia LDL goal <100    Anxiety state    Insomnia, unspecified    Reactive airway disease    Tibia/fibula fracture    Allergic rhinitis       Past Medical History:   Diagnosis Date    Cancer (H)     Elevated BP without diagnosis of hypertension     Hypertension     Uncomplicated asthma     I have had since childhood      Problem (# of Occurrences) Relation (Name,Age of Onset)    Coronary Artery Disease (1) Maternal Grandmother (Maggie Javier)          Past Surgical History:   Procedure Laterality Date    GYN SURGERY  Cervical    HYSTERECTOMY, PAP NO LONGER INDICATED      XR ANKLE SURGERY MARY LEFT Right      Social History     Socioeconomic History    Marital status:      Spouse name: None    Number of children: None    Years of education: None    Highest education level: None   Tobacco Use    Smoking status: Former     Current packs/day: 0.00     Types: Cigarettes     Start date: 1991     Quit date: 2005     Years since quittin.2    Smokeless tobacco: Never    Tobacco comments:     rarely smokes, 10/month   Vaping Use    Vaping status: Never Used   Substance and Sexual Activity    Alcohol use: Yes     Comment: occasionally    Drug use: Never    Sexual activity: Yes     Partners: Male     Birth control/protection: Female Surgical   Other Topics Concern    Parent/sibling w/ CABG, MI or angioplasty before 65F 55M? No   Social History Narrative    2024            ENVIRONMENTAL HISTORY: [place] The home is heated with a forced air. They does not have  central air conditioning. The patient's bedroom is furnished with hard karyn in bedroom and allergen pillowcase cover.  Pets inside the house include 0. There is/are 1 smokers in the house ( is an outdoor smoker).  The house does have a damp basement.      Social Determinants of Health     Financial Resource Strain: Low Risk  (5/14/2024)    Financial Resource Strain     Within the past 12 months, have you or your family members you live with been unable to get utilities (heat, electricity) when it was really needed?: No   Food Insecurity: Low Risk  (5/14/2024)    Food Insecurity     Within the past 12 months, did you worry that your food would run out before you got money to buy more?: No     Within the past 12 months, did the food you bought just not last and you didn t have money to get more?: No   Transportation Needs: Low Risk  (5/14/2024)    Transportation Needs     Within the past 12 months, has lack of transportation kept you from medical appointments, getting your medicines, non-medical meetings or appointments, work, or from getting things that you need?: No   Physical Activity: Insufficiently Active (5/14/2024)    Exercise Vital Sign     Days of Exercise per Week: 3 days     Minutes of Exercise per Session: 30 min   Stress: No Stress Concern Present (5/14/2024)    [employer] - Occupational Stress Questionnaire     Feeling of Stress : Not at all   Social Connections: Unknown (5/14/2024)    Social Connection and Isolation Panel [NHANES]     Frequency of Social Gatherings with Friends and Family: Once a week   Interpersonal Safety: Low Risk  (5/14/2024)    Interpersonal Safety     Do you feel physically and emotionally safe where you currently live?: Yes     Within the past 12 months, have you been hit, slapped, kicked or otherwise physically hurt by someone?: No     Within the past 12 months, have you been humiliated or emotionally abused in other ways by your partner  or ex-partner?: No   Housing Stability: Low Risk  (5/14/2024)    Housing Stability     Do you have housing? : Yes     Are you worried about losing your housing?: No               Current Outpatient Medications:     albuterol (PROAIR HFA/PROVENTIL HFA/VENTOLIN HFA) 108 (90 Base) MCG/ACT inhaler, Inhale 2 puffs into the lungs every 6 hours, Disp: 18 g, Rfl: 0    azelastine (ASTELIN) 0.1 % nasal spray, Spray 2 sprays into both nostrils 2 times daily as needed for rhinitis., Disp: 30 mL, Rfl: 3    budesonide (PULMICORT) 0.5 MG/2ML neb solution, Mix respule of 0.5mg/2 mL budesonide vial in 8oz normal saline sinus rinse bottle. Irrigate each nostril with half of the bottle twice daily, Disp: 120 mL, Rfl: 2    budesonide-formoterol (SYMBICORT) 80-4.5 MCG/ACT Inhaler, Inhale 2 puffs into the lungs every 4 hours as needed (Wheezing, chest tightness, shortness of breath, or persistent cough)., Disp: 10.2 g, Rfl: 3    Cetirizine HCl (ZYRTEC ALLERGY PO), , Disp: , Rfl:     fluticasone (FLONASE) 50 MCG/ACT nasal spray, Spray 1 spray into both nostrils daily, Disp: , Rfl:     irbesartan-hydrochlorothiazide (AVALIDE) 150-12.5 MG tablet, Take 1 tablet by mouth daily, Disp: 90 tablet, Rfl: 3    metoprolol succinate ER (TOPROL XL) 25 MG 24 hr tablet, Take 0.5 tablets (12.5 mg) by mouth daily, Disp: 45 tablet, Rfl: 3    multivitamin w/minerals (THERA-VIT-M) tablet, Take 1 tablet by mouth daily, Disp: , Rfl:     omega 3 1000 MG CAPS, , Disp: , Rfl:     ondansetron (ZOFRAN) 4 MG tablet, Take 1 tablet (4 mg) by mouth every 8 hours as needed for nausea (Patient not taking: Reported on 10/22/2024), Disp: 20 tablet, Rfl: 2    traZODone (DESYREL) 50 MG tablet, TAKE 1 TO 4 TABLETS BY MOUTH AT BEDTIME (Patient not taking: Reported on 10/22/2024), Disp: 270 tablet, Rfl: 1  Immunization History   Administered Date(s) Administered    COVID-19 Monovalent 18+ (Moderna) 02/04/2021, 03/04/2021, 11/08/2021    Influenza (IIV3) PF 11/05/1997     "Influenza Vaccine >6 months,quad, PF 10/09/2017, 10/18/2018, 11/05/2019, 11/08/2021, 12/11/2023    Influenza,INJ,MDCK,PF,Quad >6mo(Flucelvax) 11/15/2020    TDAP (Adacel,Boostrix) 10/10/2012, 05/14/2024    Zoster recombinant adjuvanted (SHINGRIX) 04/11/2022, 12/11/2023     Allergies   Allergen Reactions    Lisinopril      cough    Sulfa Antibiotics Difficulty breathing, Nausea and Swelling     OBJECTIVE:                                                                 /76 (BP Location: Left arm, Patient Position: Sitting, Cuff Size: Adult Regular)   Pulse 65   Ht 1.672 m (5' 5.83\")   Wt 63.9 kg (140 lb 12.8 oz)   SpO2 98%   BMI 22.85 kg/m              Physical Exam  Vitals and nursing note reviewed.   Constitutional:       General: She is not in acute distress.     Appearance: She is not ill-appearing, toxic-appearing or diaphoretic.   HENT:      Head: Normocephalic and atraumatic.      Right Ear: Tympanic membrane, ear canal and external ear normal.      Left Ear: Tympanic membrane, ear canal and external ear normal.      Nose: Mucosal edema (mild) present. No congestion or rhinorrhea.      Right Turbinates: Enlarged (mildly).      Left Turbinates: Enlarged (mildly).      Mouth/Throat:      Lips: Pink.      Mouth: Mucous membranes are moist.      Pharynx: Oropharynx is clear. No pharyngeal swelling, oropharyngeal exudate, posterior oropharyngeal erythema or uvula swelling.   Eyes:      General:         Right eye: No discharge.         Left eye: No discharge.      Conjunctiva/sclera: Conjunctivae normal.   Cardiovascular:      Rate and Rhythm: Normal rate and regular rhythm.      Heart sounds: Normal heart sounds. No murmur heard.  Pulmonary:      Effort: Pulmonary effort is normal. No respiratory distress.      Breath sounds: Normal breath sounds and air entry. No stridor, decreased air movement or transmitted upper airway sounds. No decreased breath sounds, wheezing, rhonchi or rales.   Neurological:      " Mental Status: She is alert and oriented to person, place, and time.   Psychiatric:         Mood and Affect: Mood normal.         Behavior: Behavior normal.       WORKUP:        SPIROMETRY       FVC 3.5L (96% of predicted).     FEV1 2.76L (97% of predicted).     FEV1/FVC 79%      I have reviewed and interpreted these results. Spirometry was attempted but maneuvers were not reproducible.      Asthma Control Test (ACT) total score: 25       ASSESSMENT/PLAN:         Chronic vasomotor rhinitis  Other chronic sinusitis  I am unable to perform SPT for aeroallergens today.  The patient takes oral antihistamines over-the-counter, which do not seem to be very effective.  - Ordered serum IgE for regional aeroallergen panel.  - Discontinue Flonase.  - Start azelastine 2 sprays in each nostril twice daily as needed.  - Start budesonide/saline irrigations twice daily.  - Ordered primary immune deficiency workup.    - IgE  - Allergen cat epithellium IgE  - Allergen dog epithelium IgE  - Allergen Arturo grass IgE  - Allergen orchard grass IgE  - Allergen braden IgE  - Allergen D farinae IgE  - Allergen D pteronyssinus IgE  - Allergen alternaria alternata IgE  - Allergen aspergillus fumigatus IgE  - Allergen cladosporium herbarum IgE  - Allergen Epicoccum purpurascens IgE  - Allergen penicillium notatum IgE  - Allergen alta white IgE  - Allergen Cedar IgE  - Allergen cottonwood IgE  - Allergen elm IgE  - Allergen maple box elder IgE  - Allergen oak white IgE  - Allergen Red Biscoe IgE  - Allergen silver  birch IgE  - Allergen Tree White [place]iscoe IgE  - Allergen Chunky Tree  - Allergen white pine IgE  - Allergen English plantain IgE  - Allergen giant ragweed IgE  - Allergen lamb's quarter IgE  - Allergen Mugwort IgE  - Allergen ragweed short IgE  - Allergen Sagebrush Wormwood IgE  - Allergen Sheep Sorrel IgE  - Allergen thistle Russian IgE  - Allergen Weed Nettle IgE  - Allergen, Kochia/Firebush  - Allergen horse dander IgE  -  azelastine (ASTELIN) 0.1 % nasal spray  Dispense: 30 mL; Refill: 3  - budesonide (PULMICORT) 0.5 MG/2ML neb solution  Dispense: 120 mL; Refill: 2  - Complement Activity Total (CH50)  - Diphtheria tetanus antibody panel  - IgA  - IgG  - IgM  - Strep pneumo IgG Abys 23 Serotypes  - T cell subset extended profile    Wheezing  Likely intermittent allergic asthma.  Currently well-controlled with albuterol as needed.  - Ordered CBC with differential screening for hypereosinophilia.  - Instead of albuterol, use Symbicort 80/4.5 mcg inhaler 2 puffs every 4 hours as needed for chest tightness/wheezing/shortness of breath/persistent cough.     - BREATHING CAPACITY TEST [54026]  - CBC with Platelets & Differential  - budesonide-formoterol (SYMBICORT) 80-4.5 MCG/ACT Inhaler  Dispense: 10.2 g; Refill: 3    Follow-up in 2 months or sooner if needed.      Thank you for allowing us to participate in the care of this patient. Please feel free to contact us if there are any questions or concerns about the patient.    Disclaimer: This note consists of symbols derived from keyboarding, dictation and/or voice recognition software. As a result, there may be errors in the script that have gone undetected. Please consider this when interpreting information found in this chart.    Consent was obtained from the patient to use an AI documentation tool in the creation of this note.    Best Iglesias MD, FAAAAI, FACAAI  Allergy and Asthma     [facility]

## 2024-10-22 NOTE — LETTER
10/22/2024      Dot Osborn  [address] [place] [address]      Dear Colleague,    Thank you for referring your patient, Dot Osborn, to the [facility]. Please see a copy of my visit note below.    SUBJECTIVE:                                                                 Dot Osborn presents today to our Allergy Clinic at [facility] for a new patient visit. She is a 54 year old female with concerns for environmental/seasonal allergies, possible asthma, and current sinus infections.  The patient has a longstanding history of frequent sinus infections, spanning more than 15 years. She underwent sinus surgery 15 years ago but reports minimal relief from her symptoms. She experiences persistent sinus issues characterized by nasal congestion, postnasal drainage, and sinus headaches. During sinus infections, she notes yellow and green postnasal drainage as well as rhinorrhea. Even between infections, she continues to experience nasal congestion and postnasal drainage. Her symptoms follow a perennial pattern without significant seasonal variations and are triggered by exposure to cats, dust, mowing grass, raking leaves, and respiratory infections.    The patient reports having approximately 4-6 sinus infections annually, which are typically treated with antibiotics and occasionally prednisone. She finds Augmentin effective, whereas azithromycin provides less relief. She denies any issues with her sense of smell. For management, she uses intranasal fluticasone, saline irrigations, and oxymetazoline as needed, with an awareness not to use oxymetazoline for more than three consecutive days. A sinus CT performed in March 2023 showed deviated nasal septum, postoperative changes and evidence of chronic sinus disease.  I personally reviewed and interpreted the results of the sinus CT.  She has previously consulted Dr. Felix, who  recommended surgery, but she prefers to avoid another procedure if possible.    Additionally, the patient has a history of asthma since adolescence, presenting with wheezing, chest tightness, and coughing. Her symptoms are triggered by respiratory infections and exposure to cats or horses. She finds that her albuterol inhaler provides relief within 5 to 10 minutes. She has not required prednisone treatment in the past year and has no history of asthma-related hospitalizations. Her use of albuterol as a rescue medication is less than twice weekly.  Patient Active Problem List   Diagnosis     Palpitations     Essential hypertension     Hyperlipidemia LDL goal <100     Anxiety state     Insomnia, unspecified     Reactive airway disease     Tibia/fibula fracture     Allergic rhinitis       Past Medical History:   Diagnosis Date     Cancer (H)      Elevated BP without diagnosis of hypertension      Hypertension 2019     Uncomplicated asthma     I have had since childhood      Problem (# of Occurrences) Relation (Name,Age of Onset)    Coronary Artery Disease (1) Maternal Grandmother (Maggie Javier)          Past Surgical History:   Procedure Laterality Date     GYN SURGERY  Cervical     HYSTERECTOMY, PAP NO LONGER INDICATED       XR ANKLE SURGERY MARY LEFT Right      Social History     Socioeconomic History     Marital status:      Spouse name: None     Number of children: None     Years of education: None     Highest education level: None   Tobacco Use     Smoking status: Former     Current packs/day: 0.00     Types: Cigarettes     Start date: 1991     Quit date: 2005     Years since quittin.2     Smokeless tobacco: Never     Tobacco comments:     rarely smokes, 10/month   Vaping Use     Vaping status: Never Used   Substance and Sexual Activity     Alcohol use: Yes     Comment: occasionally     Drug use: Never     Sexual activity: Yes     Partners: Male     Birth control/protection: Female  Surgical   Other Topics Concern     Parent/sibling w/ CABG, MI or angioplasty before 65F 55M? No   Social History Narrative    October 22, 2024            ENVIRONMENTAL HISTORY: [place] The home is heated with a forced air. They does not have central air conditioning. The patient's bedroom is furnished with hard karyn in bedroom and allergen pillowcase cover.  Pets inside the house include 0. There is/are 1 smokers in the house ( is an outdoor smoker).  The house does have a damp basement.      Social Determinants of Health     Financial Resource Strain: Low Risk  (5/14/2024)    Financial Resource Strain      Within the past 12 months, have you or your family members you live with been unable to get utilities (heat, electricity) when it was really needed?: No   Food Insecurity: Low Risk  (5/14/2024)    Food Insecurity      Within the past 12 months, did you worry that your food would run out before you got money to buy more?: No      Within the past 12 months, did the food you bought just not last and you didn t have money to get more?: No   Transportation Needs: Low Risk  (5/14/2024)    Transportation Needs      Within the past 12 months, has lack of transportation kept you from medical appointments, getting your medicines, non-medical meetings or appointments, work, or from getting things that you need?: No   Physical Activity: Insufficiently Active (5/14/2024)    Exercise Vital Sign      Days of Exercise per Week: 3 days      Minutes of Exercise per Session: 30 min   Stress: No Stress Concern Present (5/14/2024)    [employer] - Occupational Stress Questionnaire      Feeling of Stress : Not at all   Social Connections: Unknown (5/14/2024)    Social Connection and Isolation Panel [NHANES]      Frequency of Social Gatherings with Friends and Family: Once a week   Interpersonal Safety: Low Risk  (5/14/2024)    Interpersonal Safety       Do you feel physically and emotionally safe where you currently live?: Yes      Within the past 12 months, have you been hit, slapped, kicked or otherwise physically hurt by someone?: No      Within the past 12 months, have you been humiliated or emotionally abused in other ways by your partner or ex-partner?: No   Housing Stability: Low Risk  (5/14/2024)    Housing Stability      Do you have housing? : Yes      Are you worried about losing your housing?: No               Current Outpatient Medications:      albuterol (PROAIR HFA/PROVENTIL HFA/VENTOLIN HFA) 108 (90 Base) MCG/ACT inhaler, Inhale 2 puffs into the lungs every 6 hours, Disp: 18 g, Rfl: 0     azelastine (ASTELIN) 0.1 % nasal spray, Spray 2 sprays into both nostrils 2 times daily as needed for rhinitis., Disp: 30 mL, Rfl: 3     budesonide (PULMICORT) 0.5 MG/2ML neb solution, Mix respule of 0.5mg/2 mL budesonide vial in 8oz normal saline sinus rinse bottle. Irrigate each nostril with half of the bottle twice daily, Disp: 120 mL, Rfl: 2     budesonide-formoterol (SYMBICORT) 80-4.5 MCG/ACT Inhaler, Inhale 2 puffs into the lungs every 4 hours as needed (Wheezing, chest tightness, shortness of breath, or persistent cough)., Disp: 10.2 g, Rfl: 3     Cetirizine HCl (ZYRTEC ALLERGY PO), , Disp: , Rfl:      fluticasone (FLONASE) 50 MCG/ACT nasal spray, Spray 1 spray into both nostrils daily, Disp: , Rfl:      irbesartan-hydrochlorothiazide (AVALIDE) 150-12.5 MG tablet, Take 1 tablet by mouth daily, Disp: 90 tablet, Rfl: 3     metoprolol succinate ER (TOPROL XL) 25 MG 24 hr tablet, Take 0.5 tablets (12.5 mg) by mouth daily, Disp: 45 tablet, Rfl: 3     multivitamin w/minerals (THERA-VIT-M) tablet, Take 1 tablet by mouth daily, Disp: , Rfl:      omega 3 1000 MG CAPS, , Disp: , Rfl:      ondansetron (ZOFRAN) 4 MG tablet, Take 1 tablet (4 mg) by mouth every 8 hours as needed for nausea (Patient not taking: Reported on 10/22/2024), Disp: 20 tablet, Rfl: 2     traZODone  "(DESYREL) 50 MG tablet, TAKE 1 TO 4 TABLETS BY MOUTH AT BEDTIME (Patient not taking: Reported on 10/22/2024), Disp: 270 tablet, Rfl: 1  Immunization History   Administered Date(s) Administered     COVID-19 Monovalent 18+ (Moderna) 02/04/2021, 03/04/2021, 11/08/2021     Influenza (IIV3) PF 11/05/1997     Influenza Vaccine >6 months,quad, PF 10/09/2017, 10/18/2018, 11/05/2019, 11/08/2021, 12/11/2023     Influenza,INJ,MDCK,PF,Quad >6mo(Flucelvax) 11/15/2020     TDAP (Adacel,Boostrix) 10/10/2012, 05/14/2024     Zoster recombinant adjuvanted (SHINGRIX) 04/11/2022, 12/11/2023     Allergies   Allergen Reactions     Lisinopril      cough     Sulfa Antibiotics Difficulty breathing, Nausea and Swelling     OBJECTIVE:                                                                 /76 (BP Location: Left arm, Patient Position: Sitting, Cuff Size: Adult Regular)   Pulse 65   Ht 1.672 m (5' 5.83\")   Wt 63.9 kg (140 lb 12.8 oz)   SpO2 98%   BMI 22.85 kg/m              Physical Exam  Vitals and nursing note reviewed.   Constitutional:       General: She is not in acute distress.     Appearance: She is not ill-appearing, toxic-appearing or diaphoretic.   HENT:      Head: Normocephalic and atraumatic.      Right Ear: Tympanic membrane, ear canal and external ear normal.      Left Ear: Tympanic membrane, ear canal and external ear normal.      Nose: Mucosal edema (mild) present. No congestion or rhinorrhea.      Right Turbinates: Enlarged (mildly).      Left Turbinates: Enlarged (mildly).      Mouth/Throat:      Lips: Pink.      Mouth: Mucous membranes are moist.      Pharynx: Oropharynx is clear. No pharyngeal swelling, oropharyngeal exudate, posterior oropharyngeal erythema or uvula swelling.   Eyes:      General:         Right eye: No discharge.         Left eye: No discharge.      Conjunctiva/sclera: Conjunctivae normal.   Cardiovascular:      Rate and Rhythm: Normal rate and regular rhythm.      Heart sounds: Normal " heart sounds. No murmur heard.  Pulmonary:      Effort: Pulmonary effort is normal. No respiratory distress.      Breath sounds: Normal breath sounds and air entry. No stridor, decreased air movement or transmitted upper airway sounds. No decreased breath sounds, wheezing, rhonchi or rales.   Neurological:      Mental Status: She is alert and oriented to person, place, and time.   Psychiatric:         Mood and Affect: Mood normal.         Behavior: Behavior normal.       WORKUP:        SPIROMETRY       FVC 3.5L (96% of predicted).     FEV1 2.76L (97% of predicted).     FEV1/FVC 79%      I have reviewed and interpreted these results. Spirometry was attempted but maneuvers were not reproducible.      Asthma Control Test (ACT) total score: 25       ASSESSMENT/PLAN:         Chronic vasomotor rhinitis  Other chronic sinusitis  I am unable to perform SPT for aeroallergens today.  The patient takes oral antihistamines over-the-counter, which do not seem to be very effective.  - Ordered serum IgE for regional aeroallergen panel.  - Discontinue Flonase.  - Start azelastine 2 sprays in each nostril twice daily as needed.  - Start budesonide/saline irrigations twice daily.  - Ordered primary immune deficiency workup.    - IgE  - Allergen cat epithellium IgE  - Allergen dog epithelium IgE  - Allergen Arturo grass IgE  - Allergen orchard grass IgE  - Allergen braden IgE  - Allergen D farinae IgE  - Allergen D pteronyssinus IgE  - Allergen alternaria alternata IgE  - Allergen aspergillus fumigatus IgE  - Allergen cladosporium herbarum IgE  - Allergen Epicoccum purpurascens IgE  - Allergen penicillium notatum IgE  - Allergen alta white IgE  - Allergen Cedar IgE  - Allergen cottonwood IgE  - Allergen elm IgE  - Allergen maple box elder IgE  - Allergen oak white IgE  - Allergen Red [place] IgE  - Allergen silver  birch IgE  - Allergen Tree [place] IgE  - Allergen [place] Tree  - Allergen white pine IgE  - Allergen English  plantain IgE  - Allergen giant ragweed IgE  - Allergen lamb's quarter IgE  - Allergen Mugwort IgE  - Allergen ragweed short IgE  - Allergen Sagebrush Wormwood IgE  - Allergen Sheep Sorrel IgE  - Allergen thistle Russian IgE  - Allergen Weed Nettle IgE  - Allergen, Kochia/Firebush  - Allergen horse dander IgE  - azelastine (ASTELIN) 0.1 % nasal spray  Dispense: 30 mL; Refill: 3  - budesonide (PULMICORT) 0.5 MG/2ML neb solution  Dispense: 120 mL; Refill: 2  - Complement Activity Total (CH50)  - Diphtheria tetanus antibody panel  - IgA  - IgG  - IgM  - Strep pneumo IgG Abys 23 Serotypes  - T cell subset extended profile    Wheezing  Likely intermittent allergic asthma.  Currently well-controlled with albuterol as needed.  - Ordered CBC with differential screening for hypereosinophilia.  - Instead of albuterol, use Symbicort 80/4.5 mcg inhaler 2 puffs every 4 hours as needed for chest tightness/wheezing/shortness of breath/persistent cough.     - BREATHING CAPACITY TEST [37328]  - CBC with Platelets & Differential  - budesonide-formoterol (SYMBICORT) 80-4.5 MCG/ACT Inhaler  Dispense: 10.2 g; Refill: 3    Follow-up in 2 months or sooner if needed.      Thank you for allowing us to participate in the care of this patient. Please feel free to contact us if there are any questions or concerns about the patient.    Disclaimer: This note consists of symbols derived from keyboarding, dictation and/or voice recognition software. As a result, there may be errors in the script that have gone undetected. Please consider this when interpreting information found in this chart.    Consent was obtained from the patient to use an AI documentation tool in the creation of this note.    Best Iglesias MD, FAAAAI, FACAAI  Allergy and Asthma     [facility]        Again, thank you for allowing me to participate in the care of your patient.        Sincerely,        Best Iglesias,  MD

## 2024-10-23 ENCOUNTER — HOSPITAL ENCOUNTER (OUTPATIENT)
Dept: ULTRASOUND IMAGING | Facility: CLINIC | Age: 54
Discharge: HOME | End: 2024-10-23
Attending: PHYSICIAN ASSISTANT | Admitting: PHYSICIAN ASSISTANT
Payer: COMMERCIAL

## 2024-10-23 ENCOUNTER — NURSE TRIAGE (OUTPATIENT)
Dept: FAMILY MEDICINE | Facility: CLINIC | Age: 54
End: 2024-10-23

## 2024-10-23 ENCOUNTER — OFFICE VISIT (OUTPATIENT)
Dept: FAMILY MEDICINE | Facility: CLINIC | Age: 54
End: 2024-10-23
Payer: COMMERCIAL

## 2024-10-23 VITALS
OXYGEN SATURATION: 96 % | HEART RATE: 72 BPM | HEIGHT: 66 IN | TEMPERATURE: 98.4 F | SYSTOLIC BLOOD PRESSURE: 110 MMHG | RESPIRATION RATE: 18 BRPM | WEIGHT: 141 LBS | DIASTOLIC BLOOD PRESSURE: 72 MMHG | BODY MASS INDEX: 22.66 KG/M2

## 2024-10-23 DIAGNOSIS — R10.11 RUQ ABDOMINAL PAIN: ICD-10-CM

## 2024-10-23 DIAGNOSIS — R10.11 RUQ ABDOMINAL PAIN: Primary | ICD-10-CM

## 2024-10-23 LAB
ALBUMIN SERPL BCG-MCNC: 4.5 G/DL (ref 3.5–5.2)
ALP SERPL-CCNC: 87 U/L (ref 40–150)
ALT SERPL W P-5'-P-CCNC: 16 U/L (ref 0–50)
ANION GAP SERPL CALCULATED.3IONS-SCNC: 15 MMOL/L (ref 7–15)
AST SERPL W P-5'-P-CCNC: 23 U/L (ref 0–45)
BILIRUB SERPL-MCNC: 0.2 MG/DL
BUN SERPL-MCNC: 20.3 MG/DL (ref 6–20)
CALCIUM SERPL-MCNC: 9.2 MG/DL (ref 8.8–10.4)
CD19 CELLS # BLD: 391 CELLS/UL (ref 107–698)
CD19 CELLS NFR BLD: 10 % (ref 6–27)
CD3 CELLS # BLD: 3212 CELLS/UL (ref 603–2990)
CD3 CELLS NFR BLD: 83 % (ref 49–84)
CD3+CD4+ CELLS # BLD: 2373 CELLS/UL (ref 441–2156)
CD3+CD4+ CELLS NFR BLD: 61 % (ref 28–63)
CD3+CD4+ CELLS/CD3+CD8+ CLL BLD: 2.86 % (ref 1.4–2.6)
CD3+CD8+ CELLS # BLD: 831 CELLS/UL (ref 125–1312)
CD3+CD8+ CELLS NFR BLD: 22 % (ref 10–40)
CD3-CD16+CD56+ CELLS # BLD: 243 CELLS/UL (ref 95–640)
CD3-CD16+CD56+ CELLS NFR BLD: 6 % (ref 4–25)
CHLORIDE SERPL-SCNC: 102 MMOL/L (ref 98–107)
CREAT SERPL-MCNC: 1.18 MG/DL (ref 0.51–0.95)
EAST WHITE PINE IGE QN: <0.1 KU(A)/L
EGFRCR SERPLBLD CKD-EPI 2021: 54 ML/MIN/1.73M2
GLUCOSE SERPL-MCNC: 85 MG/DL (ref 70–99)
HCO3 SERPL-SCNC: 23 MMOL/L (ref 22–29)
IGA SERPL-MCNC: 104 MG/DL (ref 84–499)
IGE SERPL-ACNC: 815 KU/L (ref 0–114)
IGG SERPL-MCNC: 620 MG/DL (ref 610–1616)
IGM SERPL-MCNC: 127 MG/DL (ref 35–242)
NETTLE IGE QN: <0.1 KU(A)/L
POTASSIUM SERPL-SCNC: 4.4 MMOL/L (ref 3.4–5.3)
PROT SERPL-MCNC: 6.4 G/DL (ref 6.4–8.3)
SODIUM SERPL-SCNC: 140 MMOL/L (ref 135–145)
T CELL EXTENDED COMMENT: ABNORMAL

## 2024-10-23 PROCEDURE — 99214 OFFICE O/P EST MOD 30 MIN: CPT | Performed by: PHYSICIAN ASSISTANT

## 2024-10-23 PROCEDURE — 76705 ECHO EXAM OF ABDOMEN: CPT

## 2024-10-23 ASSESSMENT — PAIN SCALES - GENERAL: PAINLEVEL_OUTOF10: MODERATE PAIN (4)

## 2024-10-23 NOTE — TELEPHONE ENCOUNTER
"Nurse Triage SBAR    Is this a 2nd Level Triage? YES, LICENSED PRACTITIONER REVIEW IS REQUIRED    Situation: Patient has had abdominal pain for over a month.    Background: patient states she bent over in her truck a couple of months ago. This is when she noticed it and she thought the pain was her rib.    Assessment: It is in the center of her right abdomen.  She states it feel like there is something \" in there.\"    Current pain is 5 out of 10.  The pain is constant.  No black or bloody stool.    Protocol Recommended Disposition:   Call ADS/Go to ED/UCC Now (Or To Office with PCP Approval)    Recommendation: per protocol patient advised to be seen today. She would prefer to see her pcp. Please advise.    Routed to provider      Ruth Villasenor RN on 10/23/2024 at 9:43 AM          Reason for Disposition   MILD TO MODERATE constant pain lasting > 2 hours    Additional Information   Negative: Passed out (i.e., fainted, collapsed and was not responding)   Negative: Shock suspected (e.g., cold/pale/clammy skin, too weak to stand, low BP, rapid pulse)   Negative: Sounds like a life-threatening emergency to the triager   Negative: Followed an abdomen (stomach) injury   Negative: Chest pain   Negative: Abdominal pain and pregnant < 20 weeks   Negative: Abdominal pain and pregnant 20 or more weeks   Negative: Pain is mainly in upper abdomen (if needed ask: 'is it mainly above the belly button?')   Negative: Abdomen bloating or swelling are main symptoms   Negative: SEVERE abdominal pain (e.g., excruciating)   Negative: Vomiting red blood or black (coffee ground) material   Negative: Blood in bowel movements  (Exception: Blood on surface of BM with constipation.)   Negative: Black or tarry bowel movements  (Exception: Chronic-unchanged black-grey BMs AND is taking iron pills or Pepto-Bismol.)    Protocols used: Abdominal Pain - Female-A-OH    "

## 2024-10-23 NOTE — PROGRESS NOTES
"Ghislaine Valencia is a 54 year old, presenting for the following health issues:  Abdominal Pain      10/23/2024    11:26 AM   Additional Questions   Roomed by Tricia CHOPRA     History of Present Illness       Reason for visit:  Abdomean  Symptom onset:  More than a month  Symptoms include:  Pain in abdominal, no diarrhea, pooping fine  Symptom progression:  Worsening  Had these symptoms before:  No   She is taking medications regularly.     Patient presents today for evaluation of abdominal pain. She reports she first noticed symptoms a couple months ago when she reaching over the seat in her truck and had pain over the right ribs. She notes some discomfort still present but improving. She has since had pain/discomfort in the right upper and middle of her abdomen. Feels like there is something in there. It does not change with meals or bowel movements. Clothing feels uncomfortable. No nausea. No change in bowel movements.       Review of Systems  Constitutional, HEENT, cardiovascular, pulmonary, GI, , musculoskeletal, neuro, skin, endocrine and psych systems are negative, except as otherwise noted.      Objective    /72   Pulse 72   Temp 98.4  F (36.9  C) (Temporal)   Resp 18   Ht 1.664 m (5' 5.5\")   Wt 64 kg (141 lb)   SpO2 96%   BMI 23.11 kg/m    Body mass index is 23.11 kg/m .  Physical Exam   GENERAL: alert and no distress  NECK: no adenopathy, no asymmetry, masses, or scars  RESP: lungs clear to auscultation - no rales, rhonchi or wheezes  CV: regular rate and rhythm, normal S1 S2, no S3 or S4, no murmur, click or rub, no peripheral edema   ABDOMEN: tenderness - mild in middle and upper right abdomen, bowel sounds normal, and no palpable or pulsatile masses  SKIN: no suspicious lesions or rashes  PSYCH: mentation appears normal, affect normal/bright        Assessment & Plan     RUQ abdominal pain  Unclear etiology of symptoms at this time. Will obtain CMP (CBC completed with labs at allergist " yesterday and this was normal). Will get ultrasound but discussed if negative further imaging with CT may be needed. Reviewed symptoms that should prompt immediate care in the ER.   - Comprehensive metabolic panel (BMP + Alb, Alk Phos, ALT, AST, Total. Bili, TP); Future  - US Abdomen Limited; Future  - CT Abdomen Pelvis w/o Contrast; Future    The patient indicates understanding of these issues and agrees with the plan.    Signed Electronically by: Tricia Hernandez PA-C

## 2024-10-23 NOTE — TELEPHONE ENCOUNTER
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    She will call back if she is able to come in.    Ruth Villasenor RN on 10/23/2024 at 10:31 AM

## 2024-10-23 NOTE — RESULT ENCOUNTER NOTE
I have reviewed and interpreted these results. Spirometry was attempted but maneuvers were not reproducible.

## 2024-10-24 LAB
A ALTERNATA IGE QN: 3.05 KU(A)/L
A FUMIGATUS IGE QN: <0.1 KU(A)/L
C HERBARUM IGE QN: <0.1 KU(A)/L
CALIF WALNUT POLN IGE QN: <0.1 KU(A)/L
CAT DANDER IGG QN: 11.7 KU(A)/L
CEDAR IGE QN: <0.1 KU(A)/L
COCKSFOOT IGE QN: 0.25 KU(A)/L
COMMON RAGWEED IGE QN: 0.12 KU(A)/L
COTTONWOOD IGE QN: <0.1 KU(A)/L
D FARINAE IGE QN: 0.85 KU(A)/L
FIREBUSH IGE QN: <0.1 KU(A)/L
GOOSEFOOT IGE QN: 0.12 KU(A)/L
MAPLE IGE QN: <0.1 KU(A)/L
MUGWORT IGE QN: 0.15 KU(A)/L
P NOTATUM IGE QN: <0.1 KU(A)/L
RED MULBERRY IGE QN: <0.1 KU(A)/L
SALTWORT IGE QN: <0.1 KU(A)/L
SHEEP SORREL IGE QN: <0.1 KU(A)/L
SILVER BIRCH IGE QN: <0.1 KU(A)/L
TIMOTHY IGE QN: 0.15 KU(A)/L
WHITE ASH IGE QN: <0.1 KU(A)/L
WHITE MULBERRY IGE QN: <0.1 KU(A)/L
WHITE OAK IGE QN: <0.1 KU(A)/L
WORMWOOD IGE QN: 0.18 KU(A)/L

## 2024-10-25 LAB
CH50 SERPL-ACNC: 58.8 U/ML
D PTERONYSS IGE QN: 0.83 KU(A)/L
DOG DANDER+EPITH IGE QN: 1.23 KU(A)/L
E PURPURASCENS IGE QN: 0.16 KU(A)/L
ENGL PLANTAIN IGE QN: <0.1 KU(A)/L
GIANT RAGWEED IGE QN: 0.27 KU(A)/L
HORSE DANDER IGE QN: 0.32 KU(A)/L
JOHNSON GRASS IGE QN: 0.11 KU(A)/L
WHITE ELM IGE QN: 0.19 KU(A)/L

## 2024-10-26 LAB
C DIPHTHERIAE IGG SER-ACNC: 0.4 IU/ML
C TETANI TOXOID IGG SERPL IA-ACNC: 3.8 IU/ML

## 2024-10-30 ENCOUNTER — HOSPITAL ENCOUNTER (OUTPATIENT)
Dept: CT IMAGING | Facility: CLINIC | Age: 54
Discharge: HOME | End: 2024-10-30
Attending: PHYSICIAN ASSISTANT | Admitting: PHYSICIAN ASSISTANT
Payer: COMMERCIAL

## 2024-10-30 DIAGNOSIS — R10.11 RUQ ABDOMINAL PAIN: ICD-10-CM

## 2024-10-30 PROCEDURE — 74177 CT ABD & PELVIS W/CONTRAST: CPT

## 2024-10-30 PROCEDURE — 250N000011 HC RX IP 250 OP 636: Performed by: PHYSICIAN ASSISTANT

## 2024-10-30 PROCEDURE — 250N000009 HC RX 250: Performed by: PHYSICIAN ASSISTANT

## 2024-10-30 RX ORDER — IOPAMIDOL 755 MG/ML
500 INJECTION, SOLUTION INTRAVASCULAR ONCE
Status: COMPLETED | OUTPATIENT
Start: 2024-10-30 | End: 2024-10-30

## 2024-10-30 RX ADMIN — IOPAMIDOL 70 ML: 755 INJECTION, SOLUTION INTRAVENOUS at 16:08

## 2024-10-30 RX ADMIN — SODIUM CHLORIDE 60 ML: 9 INJECTION, SOLUTION INTRAVENOUS at 16:09

## 2024-10-31 LAB
IMMUNOLOGIST REVIEW: NORMAL
S PN DA SERO 19F IGG SER-MCNC: 0.6 MCG/ML
S PNEUM DA 1 IGG SER-MCNC: 0.4 MCG/ML
S PNEUM DA 10A IGG SER-MCNC: 0.2 MCG/ML
S PNEUM DA 11A IGG SER-MCNC: 0.1 MCG/ML
S PNEUM DA 12F IGG SER-MCNC: 0.1 MCG/ML
S PNEUM DA 14 IGG SER-MCNC: 0.7 MCG/ML
S PNEUM DA 15B IGG SER-MCNC: 0.1 MCG/ML
S PNEUM DA 17F IGG SER-MCNC: 1.1 MCG/ML
S PNEUM DA 18C IGG SER-MCNC: 0.1 MCG/ML
S PNEUM DA 19A IGG SER-MCNC: 0.2 MCG/ML
S PNEUM DA 2 IGG SER-MCNC: 0.1 MCG/ML
S PNEUM DA 20A IGG SER-MCNC: 0.5 MCG/ML
S PNEUM DA 22F IGG SER-MCNC: 0.1 MCG/ML
S PNEUM DA 23F IGG SER-MCNC: 0.6 MCG/ML
S PNEUM DA 3 IGG SER-MCNC: <0.1 MCG/ML
S PNEUM DA 33F IGG SER-MCNC: 0.4 MCG/ML
S PNEUM DA 4 IGG SER-MCNC: <0.1 MCG/ML
S PNEUM DA 5 IGG SER-MCNC: <0.1 MCG/ML
S PNEUM DA 6B IGG SER-MCNC: 0.2 MCG/ML
S PNEUM DA 7F IGG SER-MCNC: 0.1 MCG/ML
S PNEUM DA 8 IGG SER-MCNC: 0.4 MCG/ML
S PNEUM DA 9N IGG SER-MCNC: 0.3 MCG/ML
S PNEUM DA 9V IGG SER-MCNC: 0.1 MCG/ML

## 2024-11-01 NOTE — TELEPHONE ENCOUNTER
Attempted contacting patient, no answer, left a message asking for a return call. Sending MyChart as well.    Please schedule per provider note below.     Zohreh Bills, ROSHAN   
OK to add patient onto my schedule Monday for a phone visit (whenever works).     Tricia Hernandez PA-C    
See triage    Ruth Villasenor RN on 10/23/2024 at 9:44 AM    
Would you like to fit this patient in on Monday for a telephone visit? And would you like to talk about the handicap pass?    Zohreh Bills, VF  
0 = understands/communicates without difficulty

## 2024-11-03 ENCOUNTER — TELEPHONE (OUTPATIENT)
Dept: SCHEDULING | Facility: CLINIC | Age: 54
End: 2024-11-03
Payer: COMMERCIAL

## 2024-11-03 NOTE — TELEPHONE ENCOUNTER
Reason for Call:  Appointment Request    Patient requesting this type of appt:  CT SCAN RESULTS DISCUSSION    Requested provider: Tricia Hernandez    Reason patient unable to be scheduled: Not within requested timeframe    When does patient want to be seen/preferred time: Same day    Comments: PATIENT WAS ADVISED TO SCHEDULE A PHONE CALL TO DISCUSS CT RESULTS FOR 11/4/24, UNABLE TO FIND A SLOT TO ADD THIS PHONE CALL    Could we send this information to you in [facility] or would you prefer to receive a phone call?:   Patient would prefer a phone call   Okay to leave a detailed message?: Yes at Cell number on file:    Telephone Information:   Mobile [phone]       Call taken on 11/3/2024 at 3:38 PM by Gregorio Alcala

## 2024-11-04 ENCOUNTER — VIRTUAL VISIT (OUTPATIENT)
Dept: FAMILY MEDICINE | Facility: CLINIC | Age: 54
End: 2024-11-04
Payer: COMMERCIAL

## 2024-11-04 DIAGNOSIS — S16.1XXD STRAIN OF NECK MUSCLE, SUBSEQUENT ENCOUNTER: Primary | ICD-10-CM

## 2024-11-04 DIAGNOSIS — J32.8 OTHER CHRONIC SINUSITIS: Primary | ICD-10-CM

## 2024-11-04 DIAGNOSIS — Z23 NEED FOR VACCINATION AGAINST STREPTOCOCCUS PNEUMONIAE: ICD-10-CM

## 2024-11-04 PROCEDURE — 99441 PR PHYSICIAN TELEPHONE EVALUATION 5-10 MIN: CPT | Mod: 93 | Performed by: PHYSICIAN ASSISTANT

## 2024-11-04 RX ORDER — PREDNISONE 20 MG/1
TABLET ORAL
Qty: 20 TABLET | Refills: 0 | Status: SHIPPED | OUTPATIENT
Start: 2024-11-04

## 2024-11-04 RX ORDER — CYCLOBENZAPRINE HCL 10 MG
10 TABLET ORAL AT BEDTIME
Qty: 30 TABLET | Refills: 0 | Status: SHIPPED | OUTPATIENT
Start: 2024-11-04

## 2024-11-04 NOTE — RESULT ENCOUNTER NOTE
[employer] message sent:   Shonda Valencia,    I reviewed your recent lab work with us.       CBC with differential: Results are within normal limits.     Immunoglobulins A, G, and M: Within normal limits.                          Total complement within normal limits.     Total serum IgE: Elevated, which is common among patients with allergic rhinitis, asthma, food allergies, and/or eczema.     T and B cell panel: Elevated T cells. In cases of immunodeficiency, T cell levels are typically low, not elevated.     Antibody levels: Protective levels for tetanus and diphtheria were observed. Pneumococcal antibody levels, however, show 0/23 protective.    Recommendation:  - I recommend receiving the Pneumovax (not Prevnar) pneumococcal vaccine and repeating the levels in 4-6 weeks. Please confirm with the medical staff that you are receiving Pneumovax before vaccination. I will place orders for both the lab test and the vaccine. If there is any difficulty in obtaining the correct vaccine, please let us know, and we can arrange to administer it in our office.    Allergen panel:  - Serum IgE testing for the regional aeroallergen panel indicates sensitivity to cat, dog, Alternaria mold and dust mites. There is also slight sensitivity to horse, grass pollen, tree pollen, Epicoccum mold, and weed pollen, though these levels are very low and may not be clinically significant.    - I recommend avoidance measures where possible.  - No changes are necessary to the previously discussed medication treatment plan.  If symptoms persist despite medications and allergen avoidance, or if medications are not tolerated, allergen immunotherapy is recommended.   Allergen immunotherapy, also known as allergy shots, is a long-term treatment that decreases symptoms for many people with allergic rhinitis, allergic asthma, or allergic conjunctivitis. It involves gradually increasing doses of allergens to build up tolerance and reduce sensitivity,  effectively altering the immune system's response to them.      Best regards,   Best Iglesias

## 2024-11-04 NOTE — PROGRESS NOTES
Annie is a 54 year old who is being evaluated via a billable telephone visit.    What phone number would you like to be contacted at? [phone]   How would you like to obtain your AVS? MyChart  Originating Location (pt. Location): Home    Distant Location (provider location):  On-site        Subjective   Annie is a 54 year old, presenting for the following health issues:  Follow Up      11/4/2024     9:06 AM   Additional Questions   Roomed by Judi RIVERA     Go over CT results    Reviewed that CT scan did not reveal any concerning findings to explain her current pain. We discussed that very possible this is muscular related. She does question if this is the case as has noticed more tension in her neck and shoulders as well. She has previously well tolerated oral steroids. No new symptoms.       Review of Systems  Constitutional, HEENT, cardiovascular, pulmonary, gi and gu systems are negative, except as otherwise noted.      Objective           Vitals:  No vitals were obtained today due to virtual visit.    Physical Exam   General: Alert and no distress //Respiratory: No audible wheeze, cough, or shortness of breath // Psychiatric:  Appropriate affect, tone, and pace of words    Assessment & Plan     Strain of neck muscle and low back muscle, subsequent encounter  Will trial course of oral steroids along with Flexeril at bedtime. Continue supportive cares. May need to consider further work up if still not improving.   - predniSONE (DELTASONE) 20 MG tablet; Take 3 tabs by mouth daily x 3 days, then 2 tabs daily x 3 days, then 1 tab daily x 3 days, then 1/2 tab daily x 3 days.  - cyclobenzaprine (FLEXERIL) 10 MG tablet; Take 1 tablet (10 mg) by mouth at bedtime.    The patient indicates understanding of these issues and agrees with the plan.      Phone call duration: 5 minutes  Signed Electronically by: Tricia Hernandez PA-C

## 2025-01-10 DIAGNOSIS — J32.8 OTHER CHRONIC SINUSITIS: ICD-10-CM

## 2025-01-14 RX ORDER — BUDESONIDE 0.5 MG/2ML
INHALANT ORAL
Qty: 120 ML | Refills: 0 | Status: SHIPPED | OUTPATIENT
Start: 2025-01-14

## 2025-01-14 NOTE — TELEPHONE ENCOUNTER
Signed Prescriptions:                        Disp   Refills    budesonide (PULMICORT) 0.5 MG/2ML neb solu*120 mL 0        Sig: Mix respule of 0.5mg/2 mL budesonide vial in 8oz           normal saline sinus rinse bottle. Irrigate each           nostril with half of the bottle twice daily  Authorizing Provider: OMID GLASGOW  Ordering User: JC MCNEILL RN refilled medication per [facility] for 30 days as patient has upcoming appt this month.    Jc Mcneill MSN, RN   [facility], 1/14/2025 9:00 AM

## 2025-01-28 ENCOUNTER — OFFICE VISIT (OUTPATIENT)
Dept: ALLERGY | Facility: OTHER | Age: 55
End: 2025-01-28
Attending: ALLERGY & IMMUNOLOGY
Payer: COMMERCIAL

## 2025-01-28 VITALS — DIASTOLIC BLOOD PRESSURE: 82 MMHG | SYSTOLIC BLOOD PRESSURE: 136 MMHG | HEART RATE: 74 BPM | OXYGEN SATURATION: 96 %

## 2025-01-28 DIAGNOSIS — Z23 NEED FOR PNEUMOCOCCAL VACCINATION: ICD-10-CM

## 2025-01-28 DIAGNOSIS — J30.89 ALLERGIC RHINITIS DUE TO DUST MITE: ICD-10-CM

## 2025-01-28 DIAGNOSIS — J45.20 MILD INTERMITTENT ASTHMA WITHOUT COMPLICATION: ICD-10-CM

## 2025-01-28 DIAGNOSIS — J30.81 ALLERGIC RHINITIS DUE TO ANIMALS: ICD-10-CM

## 2025-01-28 DIAGNOSIS — J30.89 ALLERGIC RHINITIS CAUSED BY MOLD: ICD-10-CM

## 2025-01-28 DIAGNOSIS — J32.8 OTHER CHRONIC SINUSITIS: Primary | ICD-10-CM

## 2025-01-28 PROCEDURE — 90732 PPSV23 VACC 2 YRS+ SUBQ/IM: CPT | Performed by: ALLERGY & IMMUNOLOGY

## 2025-01-28 PROCEDURE — 99214 OFFICE O/P EST MOD 30 MIN: CPT | Mod: 25 | Performed by: ALLERGY & IMMUNOLOGY

## 2025-01-28 RX ORDER — AZELASTINE 1 MG/ML
2 SPRAY, METERED NASAL 2 TIMES DAILY PRN
Qty: 30 ML | Refills: 3 | Status: SHIPPED | OUTPATIENT
Start: 2025-01-28

## 2025-01-28 RX ORDER — BUDESONIDE 0.5 MG/2ML
INHALANT ORAL
Qty: 120 ML | Refills: 2 | Status: SHIPPED | OUTPATIENT
Start: 2025-01-28

## 2025-01-28 ASSESSMENT — ASTHMA QUESTIONNAIRES: ACT_TOTALSCORE: 25

## 2025-01-28 NOTE — PATIENT INSTRUCTIONS
- Budesonide/saline irrigations once daily, and saline irrigations second time of the day.   -Use azelastine 2 sprays in each nostril twice a day when necessary.     Continue Symbicort as needed.       Get the vaccine today, and repeat the labs in 4-6 weeks.      BUDESONIDE IRRIGATION INSTRUCTIONS    You will be starting Budesonide nasal irrigations and will need to obtain the following:       - NeilMed Sinus Rinse 8 oz Kit  - Distilled or filtered water   - Normal saline salt packets  - Budesonide medication      Place filtered or distilled water into the NeilMed bottle up to the fill line (DO NOT USE TAP OR WELL WATER).  Place the pre-made salt packet in the 8 oz of saline.  Then placed the budesonide medication into the bottle.  Shake the bottle to suspend into solution.  Lean head forward over a sink or a basin.  Rinse each side of the nose with one-half of the bottle (each squeeze is about one-half of the bottle). Rinse the nose twice daily.         Video example: https://www.youEdyn.com/watch?[email or web address]         Dr Iglesias [phone]    All visits for food challenges, venom allergy testing, and medication/drug challenges MUST be scheduled through the allergy clinic nurse. Please send a [employer] message or call the allergy scheduling line and ask to speak with Dr Iglesias's team for scheduling these appointments. Appointments for these visits that are made through the schedulers or via [employer] may be cancelled or rescheduled.    Allergy Shot Room [facility]): [phone]    Pulmonary Function [phone]    Prescription Assistance  If you need assistance with your prescriptions (cost, coverage, etc) please contact: [facility] [phone]

## 2025-01-28 NOTE — PROGRESS NOTES
SUBJECTIVE:                                                                   Dot Osborn presents today to our Allergy Clinic at [facility] for a follow up visit.   The patient is a 54-year-old female with a history of allergic rhinitis, recurrent sinus infections, and episodes of wheezing, likely representing intermittent asthma.    She has a longstanding history of sinus infections, which began in her late 30s to early 40s. She underwent sinus surgery in her 40s, but despite this, she continues to experience sinus symptoms even in the absence of active infections.    In October 2024, serum IgE testing for the regional aeroallergen panel revealed sensitivities to cat, dog, Alternaria mold, and dust mites, with slight sensitivities to horse, grass pollen, tree pollen, Epicoccum mold, and weed pollen.    A sinus CT performed in March 2023 showed a deviated nasal septum, postoperative changes, and evidence of chronic sinus disease.    A primary immunodeficiency workup was unremarkable except for suboptimal pneumococcal antibody levels. Pneumovax vaccination and repeat antibody testing were recommended but have not been completed.   History of asthma when she was a teenager.  Symptoms triggered by respiratory infections and exposure to cats or horses.      The patient states that she was unaware she needed to receive the Pneumovax vaccine and then repeat post-vaccination levels.    She has been using budesonide irrigations but has been mixing them with water alone, without a saline packet. Despite this, she reports that her sinonasal symptoms have improved significantly and currently feel almost normal. She finds budesonide irrigations, along with azelastine nasal spray in the morning, very helpful. However, she does experience some burning with the budesonide irrigations.  She does not need oral antihistamines.  Annie denies any recent sinus infections requiring antibiotics.  Additionally, she is interested in learning more about allergen immunotherapy.    Her asthma remains well-controlled with Symbicort as needed, which she uses about once a week or less. She denies nocturnal symptoms.    Patient Active Problem List   Diagnosis    Palpitations    Essential hypertension    Hyperlipidemia LDL goal <100    Anxiety state    Insomnia, unspecified    Reactive airway disease    Tibia/fibula fracture    Allergic rhinitis    Allergic rhinitis due to animals    Allergic rhinitis caused by mold    Allergic rhinitis due to dust mite       Past Medical History:   Diagnosis Date    Cancer (H)     Elevated BP without diagnosis of hypertension     Hypertension 2019    Uncomplicated asthma     I have had since childhood      Problem (# of Occurrences) Relation (Name,Age of Onset)    Coronary Artery Disease (1) Maternal Grandmother (Maggie Javier)          Past Surgical History:   Procedure Laterality Date    GYN SURGERY  Cervical    HYSTERECTOMY, PAP NO LONGER INDICATED      XR ANKLE SURGERY MARY LEFT Right      Social History     Socioeconomic History    Marital status:      Spouse name: None    Number of children: None    Years of education: None    Highest education level: None   Tobacco Use    Smoking status: Former     Current packs/day: 0.00     Types: Cigarettes     Start date: 1991     Quit date: 2005     Years since quittin.5    Smokeless tobacco: Never    Tobacco comments:     rarely smokes, 10/month   Vaping Use    Vaping status: Never Used   Substance and Sexual Activity    Alcohol use: Yes     Comment: occasionally    Drug use: Never    Sexual activity: Yes     Partners: Male     Birth control/protection: Female Surgical   Other Topics Concern    Parent/sibling w/ CABG, MI or angioplasty before 65F 55M? No   Social History Narrative    2025                ENVIRONMENTAL HISTORY: [place] The [place] is heated  with a forced air. They does not have central air conditioning. The patient's bedroom is furnished with hard karyn in bedroom and allergen pillowcase cover.  Pets inside the house include 0. There is/are 1 smokers in the house ( is an outdoor smoker).  The house does have a damp basement.      Social Drivers of Health     Financial Resource Strain: Low Risk  (5/14/2024)    Financial Resource Strain     Within the past 12 months, have you or your family members you live with been unable to get utilities (heat, electricity) when it was really needed?: No   Food Insecurity: Low Risk  (5/14/2024)    Food Insecurity     Within the past 12 months, did you worry that your food would run out before you got money to buy more?: No     Within the past 12 months, did the food you bought just not last and you didn t have money to get more?: No   Transportation Needs: Low Risk  (5/14/2024)    Transportation Needs     Within the past 12 months, has lack of transportation kept you from medical appointments, getting your medicines, non-medical meetings or appointments, work, or from getting things that you need?: No   Physical Activity: Insufficiently Active (5/14/2024)    Exercise Vital Sign     Days of Exercise per Week: 3 days     Minutes of Exercise per Session: 30 min   Stress: No Stress Concern Present (5/14/2024)    [employer] - Occupational Stress Questionnaire     Feeling of Stress : Not at all   Social Connections: Unknown (5/14/2024)    Social Connection and Isolation Panel [NHANES]     Frequency of Social Gatherings with Friends and Family: Once a week   Interpersonal Safety: Low Risk  (5/14/2024)    Interpersonal Safety     Do you feel physically and emotionally safe where you currently live?: Yes     Within the past 12 months, have you been hit, slapped, kicked or otherwise physically hurt by someone?: No     Within the past 12 months, have you been humiliated or emotionally  abused in other ways by your partner or ex-partner?: No   Housing Stability: Low Risk  (5/14/2024)    Housing Stability     Do you have housing? : Yes     Are you worried about losing your housing?: No               Current Outpatient Medications:     azelastine (ASTELIN) 0.1 % nasal spray, Spray 2 sprays into both nostrils 2 times daily as needed for rhinitis., Disp: 30 mL, Rfl: 3    budesonide (PULMICORT) 0.5 MG/2ML neb solution, Mix respule of 0.5mg/2 mL budesonide vial in 8oz normal saline sinus rinse bottle. Irrigate each nostril with half of the bottle twice daily, Disp: 120 mL, Rfl: 2    budesonide-formoterol (SYMBICORT) 80-4.5 MCG/ACT Inhaler, Inhale 2 puffs into the lungs every 4 hours as needed (Wheezing, chest tightness, shortness of breath, or persistent cough)., Disp: 10.2 g, Rfl: 3    irbesartan-hydrochlorothiazide (AVALIDE) 150-12.5 MG tablet, Take 1 tablet by mouth daily, Disp: 90 tablet, Rfl: 3    metoprolol succinate ER (TOPROL XL) 25 MG 24 hr tablet, Take 0.5 tablets (12.5 mg) by mouth daily, Disp: 45 tablet, Rfl: 3    multivitamin w/minerals (THERA-VIT-M) tablet, Take 1 tablet by mouth daily, Disp: , Rfl:     omega 3 1000 MG CAPS, , Disp: , Rfl:     ondansetron (ZOFRAN) 4 MG tablet, Take 1 tablet (4 mg) by mouth every 8 hours as needed for nausea, Disp: 20 tablet, Rfl: 2    traZODone (DESYREL) 50 MG tablet, TAKE 1 TO 4 TABLETS BY MOUTH AT BEDTIME, Disp: 270 tablet, Rfl: 1    albuterol (PROAIR HFA/PROVENTIL HFA/VENTOLIN HFA) 108 (90 Base) MCG/ACT inhaler, Inhale 2 puffs into the lungs every 6 hours (Patient not taking: Reported on 1/28/2025), Disp: 18 g, Rfl: 0    Cetirizine HCl (ZYRTEC ALLERGY PO), , Disp: , Rfl:     cyclobenzaprine (FLEXERIL) 10 MG tablet, Take 1 tablet (10 mg) by mouth at bedtime. (Patient not taking: Reported on 1/28/2025), Disp: 30 tablet, Rfl: 0    fluticasone (FLONASE) 50 MCG/ACT nasal spray, Spray 1 spray into both nostrils daily (Patient not taking: Reported on  1/28/2025), Disp: , Rfl:   Immunization History   Administered Date(s) Administered    COVID-19 Monovalent 18+ (Moderna) 02/04/2021, 03/04/2021, 11/08/2021    Influenza (IIV3) PF 11/05/1997, 10/21/2010    Influenza Vaccine >6 months,quad, PF 10/09/2017, 10/18/2018, 11/05/2019, 11/08/2021, 12/11/2023    Influenza,INJ,MDCK,PF,Quad >6mo(Flucelvax) 11/15/2020    Pneumococcal 23 valent 01/28/2025    TDAP (Adacel,Boostrix) 10/10/2012, 05/14/2024    Zoster recombinant adjuvanted (SHINGRIX) 04/11/2022, 12/11/2023     Allergies   Allergen Reactions    Lisinopril      cough    Sulfa Antibiotics Difficulty breathing, Nausea and Swelling     OBJECTIVE:                                                                 /82 (BP Location: Left arm, Patient Position: Sitting, Cuff Size: Adult Regular)   Pulse 74   SpO2 96%         Physical Exam  Vitals and nursing note reviewed.   Constitutional:       General: She is not in acute distress.     Appearance: She is not ill-appearing, toxic-appearing or diaphoretic.   HENT:      Head: Normocephalic and atraumatic.      Right Ear: Tympanic membrane, ear canal and external ear normal.      Left Ear: Tympanic membrane, ear canal and external ear normal.      Nose: Mucosal edema (mild) present. No congestion or rhinorrhea.      Right Turbinates: Enlarged (mildly).      Left Turbinates: Enlarged (mildly).      Mouth/Throat:      Lips: Pink.      Mouth: Mucous membranes are moist.      Pharynx: Oropharynx is clear. No pharyngeal swelling, oropharyngeal exudate, posterior oropharyngeal erythema or uvula swelling.   Eyes:      General:         Right eye: No discharge.         Left eye: No discharge.      Conjunctiva/sclera: Conjunctivae normal.   Cardiovascular:      Rate and Rhythm: Normal rate and regular rhythm.      Heart sounds: Normal heart sounds. No murmur heard.  Pulmonary:      Effort: Pulmonary effort is normal. No respiratory distress.      Breath sounds: Normal breath sounds  and air entry. No stridor, decreased air movement or transmitted upper airway sounds. No decreased breath sounds, wheezing, rhonchi or rales.   Neurological:      Mental Status: She is alert and oriented to person, place, and time.   Psychiatric:         Mood and Affect: Mood normal.         Behavior: Behavior normal.           WORKUP:      ACT: 25    ASSESSMENT/PLAN:         Allergic rhinitis due to animals  Allergic rhinitis caused by mold  Allergic rhinitis due to dust mite  Other chronic sinusitis    Thee patient's symptoms have improved.    -Continue azelastine nasal spray: 2 sprays in each nostril twice daily as needed.  -Use saline irrigations once daily and budesonide/saline irrigations a second time each day. Proper instructions were provided on how to create a saline solution with budesonide mix.    -Administered pneumococcal vaccine today.  -Repeat pneumococcal antibody levels in 4 to 6 weeks to assess response.  -The patient is interested in allergen immunotherapy to address the root of the problem.  Provided information about allergen immunotherapy, including billing codes, to aid in decision-making.    - Adult Allergy Clinic Follow-Up Order  - budesonide (PULMICORT) 0.5 MG/2ML neb solution  Dispense: 120 mL; Refill: 2  - azelastine (ASTELIN) 0.1 % nasal spray  Dispense: 30 mL; Refill: 3    Mild intermittent asthma without complication     Well-controlled with Symbicort as needed.  - Continue without changes.    - Adult Allergy Clinic Follow-Up Order    Need for pneumococcal vaccination    - PNEUMOCOCCAL POLYSACCHARIDE PPV23 (PNEUMOVAX 23)     Follow-up in 2 to 3 months or sooner if needed.    Thank you for allowing us to participate in the care of this patient. Please feel free to contact us if there are any questions or concerns about the patient.    Disclaimer: This note consists of symbols derived from keyboarding, dictation and/or voice recognition software. As a result, there may be errors in the  script that have gone undetected. Please consider this when interpreting information found in this chart.    Best Iglesias MD, FAAAAI, FACAAI  Allergy, Asthma and Immunology     [facility]

## 2025-01-28 NOTE — LETTER
1/28/2025      Dot Osborn  [address] [place] [address]      Dear Colleague,    Thank you for referring your patient, Dot Osborn, to the [facility]. Please see a copy of my visit note below.    SUBJECTIVE:                                                                   Dot Osborn presents today to our Allergy Clinic at [facility] for a follow up visit.   The patient is a 54-year-old female with a history of allergic rhinitis, recurrent sinus infections, and episodes of wheezing, likely representing intermittent asthma.    She has a longstanding history of sinus infections, which began in her late 30s to early 40s. She underwent sinus surgery in her 40s, but despite this, she continues to experience sinus symptoms even in the absence of active infections.    In October 2024, serum IgE testing for the regional aeroallergen panel revealed sensitivities to cat, dog, Alternaria mold, and dust mites, with slight sensitivities to horse, grass pollen, tree pollen, Epicoccum mold, and weed pollen.    A sinus CT performed in March 2023 showed a deviated nasal septum, postoperative changes, and evidence of chronic sinus disease.    A primary immunodeficiency workup was unremarkable except for suboptimal pneumococcal antibody levels. Pneumovax vaccination and repeat antibody testing were recommended but have not been completed.   History of asthma when she was a teenager.  Symptoms triggered by respiratory infections and exposure to cats or horses.      The patient states that she was unaware she needed to receive the Pneumovax vaccine and then repeat post-vaccination levels.    She has been using budesonide irrigations but has been mixing them with water alone, without a saline packet. Despite this, she reports that her sinonasal symptoms have improved significantly and currently feel almost normal. She finds budesonide  irrigations, along with azelastine nasal spray in the morning, very helpful. However, she does experience some burning with the budesonide irrigations.  She does not need oral antihistamines.  Annie denies any recent sinus infections requiring antibiotics. Additionally, she is interested in learning more about allergen immunotherapy.    Her asthma remains well-controlled with Symbicort as needed, which she uses about once a week or less. She denies nocturnal symptoms.    Patient Active Problem List   Diagnosis     Palpitations     Essential hypertension     Hyperlipidemia LDL goal <100     Anxiety state     Insomnia, unspecified     Reactive airway disease     Tibia/fibula fracture     Allergic rhinitis     Allergic rhinitis due to animals     Allergic rhinitis caused by mold     Allergic rhinitis due to dust mite       Past Medical History:   Diagnosis Date     Cancer (H)      Elevated BP without diagnosis of hypertension      Hypertension      Uncomplicated asthma     I have had since childhood      Problem (# of Occurrences) Relation (Name,Age of Onset)    Coronary Artery Disease (1) Maternal Grandmother (Maggie Javier)          Past Surgical History:   Procedure Laterality Date     GYN SURGERY  Cervical     HYSTERECTOMY, PAP NO LONGER INDICATED       XR ANKLE SURGERY MARY LEFT Right      Social History     Socioeconomic History     Marital status:      Spouse name: None     Number of children: None     Years of education: None     Highest education level: None   Tobacco Use     Smoking status: Former     Current packs/day: 0.00     Types: Cigarettes     Start date: 1991     Quit date: 2005     Years since quittin.5     Smokeless tobacco: Never     Tobacco comments:     rarely smokes, 10/month   Vaping Use     Vaping status: Never Used   Substance and Sexual Activity     Alcohol use: Yes     Comment: occasionally     Drug use: Never     Sexual activity: Yes     Partners: Male      Birth control/protection: Female Surgical   Other Topics Concern     Parent/sibling w/ CABG, MI or angioplasty before 65F 55M? No   Social History Narrative    January 28, 2025                ENVIRONMENTAL HISTORY: [place] The [place] is heated with a forced air. They does not have central air conditioning. The patient's bedroom is furnished with hard karyn in bedroom and allergen pillowcase cover.  Pets inside the house include 0. There is/are 1 smokers in the house ( is an outdoor smoker).  The house does have a damp basement.      Social Drivers of Health     Financial Resource Strain: Low Risk  (5/14/2024)    Financial Resource Strain      Within the past 12 months, have you or your family members you live with been unable to get utilities (heat, electricity) when it was really needed?: No   Food Insecurity: Low Risk  (5/14/2024)    Food Insecurity      Within the past 12 months, did you worry that your food would run out before you got money to buy more?: No      Within the past 12 months, did the food you bought just not last and you didn t have money to get more?: No   Transportation Needs: Low Risk  (5/14/2024)    Transportation Needs      Within the past 12 months, has lack of transportation kept you from medical appointments, getting your medicines, non-medical meetings or appointments, work, or from getting things that you need?: No   Physical Activity: Insufficiently Active (5/14/2024)    Exercise Vital Sign      Days of Exercise per Week: 3 days      Minutes of Exercise per Session: 30 min   Stress: No Stress Concern Present (5/14/2024)    [employer] [place] [employer] - Occupational Stress Questionnaire      Feeling of Stress : Not at all   Social Connections: Unknown (5/14/2024)    Social Connection and Isolation Panel [NHANES]      Frequency of Social Gatherings with Friends and Family: Once a week   Interpersonal Safety: Low Risk   (5/14/2024)    Interpersonal Safety      Do you feel physically and emotionally safe where you currently live?: Yes      Within the past 12 months, have you been hit, slapped, kicked or otherwise physically hurt by someone?: No      Within the past 12 months, have you been humiliated or emotionally abused in other ways by your partner or ex-partner?: No   Housing Stability: Low Risk  (5/14/2024)    Housing Stability      Do you have housing? : Yes      Are you worried about losing your housing?: No               Current Outpatient Medications:      azelastine (ASTELIN) 0.1 % nasal spray, Spray 2 sprays into both nostrils 2 times daily as needed for rhinitis., Disp: 30 mL, Rfl: 3     budesonide (PULMICORT) 0.5 MG/2ML neb solution, Mix respule of 0.5mg/2 mL budesonide vial in 8oz normal saline sinus rinse bottle. Irrigate each nostril with half of the bottle twice daily, Disp: 120 mL, Rfl: 2     budesonide-formoterol (SYMBICORT) 80-4.5 MCG/ACT Inhaler, Inhale 2 puffs into the lungs every 4 hours as needed (Wheezing, chest tightness, shortness of breath, or persistent cough)., Disp: 10.2 g, Rfl: 3     irbesartan-hydrochlorothiazide (AVALIDE) 150-12.5 MG tablet, Take 1 tablet by mouth daily, Disp: 90 tablet, Rfl: 3     metoprolol succinate ER (TOPROL XL) 25 MG 24 hr tablet, Take 0.5 tablets (12.5 mg) by mouth daily, Disp: 45 tablet, Rfl: 3     multivitamin w/minerals (THERA-VIT-M) tablet, Take 1 tablet by mouth daily, Disp: , Rfl:      omega 3 1000 MG CAPS, , Disp: , Rfl:      ondansetron (ZOFRAN) 4 MG tablet, Take 1 tablet (4 mg) by mouth every 8 hours as needed for nausea, Disp: 20 tablet, Rfl: 2     traZODone (DESYREL) 50 MG tablet, TAKE 1 TO 4 TABLETS BY MOUTH AT BEDTIME, Disp: 270 tablet, Rfl: 1     albuterol (PROAIR HFA/PROVENTIL HFA/VENTOLIN HFA) 108 (90 Base) MCG/ACT inhaler, Inhale 2 puffs into the lungs every 6 hours (Patient not taking: Reported on 1/28/2025), Disp: 18 g, Rfl: 0     Cetirizine HCl (ZYRTEC  ALLERGY PO), , Disp: , Rfl:      cyclobenzaprine (FLEXERIL) 10 MG tablet, Take 1 tablet (10 mg) by mouth at bedtime. (Patient not taking: Reported on 1/28/2025), Disp: 30 tablet, Rfl: 0     fluticasone (FLONASE) 50 MCG/ACT nasal spray, Spray 1 spray into both nostrils daily (Patient not taking: Reported on 1/28/2025), Disp: , Rfl:   Immunization History   Administered Date(s) Administered     COVID-19 Monovalent 18+ (Moderna) 02/04/2021, 03/04/2021, 11/08/2021     Influenza (IIV3) PF 11/05/1997, 10/21/2010     Influenza Vaccine >6 months,quad, PF 10/09/2017, 10/18/2018, 11/05/2019, 11/08/2021, 12/11/2023     Influenza,INJ,MDCK,PF,Quad >6mo(Flucelvax) 11/15/2020     Pneumococcal 23 valent 01/28/2025     TDAP (Adacel,Boostrix) 10/10/2012, 05/14/2024     Zoster recombinant adjuvanted (SHINGRIX) 04/11/2022, 12/11/2023     Allergies   Allergen Reactions     Lisinopril      cough     Sulfa Antibiotics Difficulty breathing, Nausea and Swelling     OBJECTIVE:                                                                 /82 (BP Location: Left arm, Patient Position: Sitting, Cuff Size: Adult Regular)   Pulse 74   SpO2 96%         Physical Exam  Vitals and nursing note reviewed.   Constitutional:       General: She is not in acute distress.     Appearance: She is not ill-appearing, toxic-appearing or diaphoretic.   HENT:      Head: Normocephalic and atraumatic.      Right Ear: Tympanic membrane, ear canal and external ear normal.      Left Ear: Tympanic membrane, ear canal and external ear normal.      Nose: Mucosal edema (mild) present. No congestion or rhinorrhea.      Right Turbinates: Enlarged (mildly).      Left Turbinates: Enlarged (mildly).      Mouth/Throat:      Lips: Pink.      Mouth: Mucous membranes are moist.      Pharynx: Oropharynx is clear. No pharyngeal swelling, oropharyngeal exudate, posterior oropharyngeal erythema or uvula swelling.   Eyes:      General:         Right eye: No discharge.          Left eye: No discharge.      Conjunctiva/sclera: Conjunctivae normal.   Cardiovascular:      Rate and Rhythm: Normal rate and regular rhythm.      Heart sounds: Normal heart sounds. No murmur heard.  Pulmonary:      Effort: Pulmonary effort is normal. No respiratory distress.      Breath sounds: Normal breath sounds and air entry. No stridor, decreased air movement or transmitted upper airway sounds. No decreased breath sounds, wheezing, rhonchi or rales.   Neurological:      Mental Status: She is alert and oriented to person, place, and time.   Psychiatric:         Mood and Affect: Mood normal.         Behavior: Behavior normal.           WORKUP:      ACT: 25    ASSESSMENT/PLAN:         Allergic rhinitis due to animals  Allergic rhinitis caused by mold  Allergic rhinitis due to dust mite  Other chronic sinusitis    Thee patient's symptoms have improved.    -Continue azelastine nasal spray: 2 sprays in each nostril twice daily as needed.  -Use saline irrigations once daily and budesonide/saline irrigations a second time each day. Proper instructions were provided on how to create a saline solution with budesonide mix.    -Administered pneumococcal vaccine today.  -Repeat pneumococcal antibody levels in 4 to 6 weeks to assess response.  -The patient is interested in allergen immunotherapy to address the root of the problem.  Provided information about allergen immunotherapy, including billing codes, to aid in decision-making.    - Adult Allergy Clinic Follow-Up Order  - budesonide (PULMICORT) 0.5 MG/2ML neb solution  Dispense: 120 mL; Refill: 2  - azelastine (ASTELIN) 0.1 % nasal spray  Dispense: 30 mL; Refill: 3    Mild intermittent asthma without complication     Well-controlled with Symbicort as needed.  - Continue without changes.    - Adult Allergy Clinic Follow-Up Order    Need for pneumococcal vaccination    - PNEUMOCOCCAL POLYSACCHARIDE PPV23 (PNEUMOVAX 23)     Follow-up in 2 to 3 months or sooner if  needed.    Thank you for allowing us to participate in the care of this patient. Please feel free to contact us if there are any questions or concerns about the patient.    Disclaimer: This note consists of symbols derived from keyboarding, dictation and/or voice recognition software. As a result, there may be errors in the script that have gone undetected. Please consider this when interpreting information found in this chart.    Best Iglesias MD, FAAAAI, FACAAI  Allergy, Asthma and Immunology     [facility]        Again, thank you for allowing me to participate in the care of your patient.        Sincerely,        Best Iglesias MD    Electronically signed

## 2025-01-28 NOTE — NURSING NOTE
Prior to immunization administration, verified patients identity using patient s name and date of birth. Please see Immunization Activity for additional information.     Screening Questionnaire for Adult Immunization    Are you sick today?   No   Do you have allergies to medications, food, a vaccine component or latex?   Yes   Have you ever had a serious reaction after receiving a vaccination?   No   Do you have a long-term health problem with heart, lung, kidney, or metabolic disease (e.g., diabetes), asthma, a blood disorder, no spleen, complement component deficiency, a cochlear implant, or a spinal fluid leak?  Are you on long-term aspirin therapy?   No   Do you have cancer, leukemia, HIV/AIDS, or any other immune system problem?   No   Do you have a parent, brother, or sister with an immune system problem?   No   In the past 3 months, have you taken medications that affect  your immune system, such as prednisone, other steroids, or anticancer drugs; drugs for the treatment of rheumatoid arthritis, Crohn s disease, or psoriasis; or have you had radiation treatments?   No   Have you had a seizure, or a brain or other nervous system problem?   No   During the past year, have you received a transfusion of blood or blood    products, or been given immune (gamma) globulin or antiviral drug?   No   For women: Are you pregnant or is there a chance you could become       pregnant during the next month?   No   Have you received any vaccinations in the past 4 weeks?   No     Immunization questionnaire answers were all negative.      Patient instructed to remain in clinic for 15 minutes afterwards, and to report any adverse reactions.     Screening performed by Kacy Mcneill RN on 1/28/2025 at 4:38 PM.

## 2025-02-11 DIAGNOSIS — R06.2 WHEEZING: ICD-10-CM

## 2025-02-11 RX ORDER — BUDESONIDE AND FORMOTEROL FUMARATE DIHYDRATE 80; 4.5 UG/1; UG/1
2 AEROSOL RESPIRATORY (INHALATION) EVERY 4 HOURS PRN
Qty: 10.2 G | Refills: 0 | Status: SHIPPED | OUTPATIENT
Start: 2025-02-11

## 2025-02-11 NOTE — TELEPHONE ENCOUNTER
Signed Prescriptions:                        Disp   Refills    budesonide-formoterol (SYMBICORT) 80-4.5 M*10.2 g 0        Sig: Inhale 2 puffs into the lungs every 4 hours as needed           (Wheezing, chest tightness, shortness of breath,           or persistent cough).  Authorizing Provider: OMID GLASGOW  Ordering User: JC MCNEILL    RN refilled medication per [facility] Refill Protocol.     Jc Mcneill MSN, RN   Specialty Clinic, 2/11/2025 10:26 AM

## 2025-02-11 NOTE — TELEPHONE ENCOUNTER
Requested Prescriptions   Pending Prescriptions Disp Refills    budesonide-formoterol (SYMBICORT) 80-4.5 MCG/ACT Inhaler 10.2 g 3     Sig: Inhale 2 puffs into the lungs every 4 hours as needed (Wheezing, chest tightness, shortness of breath, or persistent cough).       There is no refill protocol information for this order        Last OV 1/28/25

## 2025-03-05 ENCOUNTER — LAB (OUTPATIENT)
Dept: LAB | Facility: OTHER | Age: 55
End: 2025-03-05
Payer: COMMERCIAL

## 2025-03-05 DIAGNOSIS — J32.8 OTHER CHRONIC SINUSITIS: ICD-10-CM

## 2025-03-05 DIAGNOSIS — Z23 NEED FOR VACCINATION AGAINST STREPTOCOCCUS PNEUMONIAE: ICD-10-CM

## 2025-03-05 PROCEDURE — 86581 STRPTCS PNEUM ANTB SEROT IA: CPT | Mod: 90

## 2025-03-05 PROCEDURE — 86355 B CELLS TOTAL COUNT: CPT

## 2025-03-05 PROCEDURE — 99000 SPECIMEN HANDLING OFFICE-LAB: CPT

## 2025-03-05 PROCEDURE — 86357 NK CELLS TOTAL COUNT: CPT

## 2025-03-05 PROCEDURE — 86359 T CELLS TOTAL COUNT: CPT

## 2025-03-05 PROCEDURE — 86360 T CELL ABSOLUTE COUNT/RATIO: CPT

## 2025-03-05 PROCEDURE — 36415 COLL VENOUS BLD VENIPUNCTURE: CPT

## 2025-03-06 LAB
CD19 CELLS # BLD: 428 CELLS/UL (ref 107–698)
CD19 CELLS NFR BLD: 10 % (ref 6–27)
CD3 CELLS # BLD: 3606 CELLS/UL (ref 603–2990)
CD3 CELLS NFR BLD: 83 % (ref 49–84)
CD3+CD4+ CELLS # BLD: 2626 CELLS/UL (ref 441–2156)
CD3+CD4+ CELLS NFR BLD: 60 % (ref 28–63)
CD3+CD4+ CELLS/CD3+CD8+ CLL BLD: 2.69 % (ref 1.4–2.6)
CD3+CD8+ CELLS # BLD: 975 CELLS/UL (ref 125–1312)
CD3+CD8+ CELLS NFR BLD: 22 % (ref 10–40)
CD3-CD16+CD56+ CELLS # BLD: 278 CELLS/UL (ref 95–640)
CD3-CD16+CD56+ CELLS NFR BLD: 6 % (ref 4–25)
T CELL EXTENDED COMMENT: ABNORMAL

## 2025-03-10 DIAGNOSIS — J32.8 OTHER CHRONIC SINUSITIS: Primary | ICD-10-CM

## 2025-03-10 LAB
IMMUNOLOGIST REVIEW: NORMAL
S PN DA SERO 19F IGG SER-MCNC: 1.2 MCG/ML
S PNEUM DA 1 IGG SER-MCNC: 31.9 MCG/ML
S PNEUM DA 10A IGG SER-MCNC: 4.8 MCG/ML
S PNEUM DA 11A IGG SER-MCNC: 0.5 MCG/ML
S PNEUM DA 12F IGG SER-MCNC: 0.1 MCG/ML
S PNEUM DA 14 IGG SER-MCNC: 4.8 MCG/ML
S PNEUM DA 15B IGG SER-MCNC: 3.9 MCG/ML
S PNEUM DA 17F IGG SER-MCNC: 4.8 MCG/ML
S PNEUM DA 18C IGG SER-MCNC: 6.4 MCG/ML
S PNEUM DA 19A IGG SER-MCNC: NORMAL MCG/ML
S PNEUM DA 2 IGG SER-MCNC: 4.1 MCG/ML
S PNEUM DA 20A IGG SER-MCNC: 5.8 MCG/ML
S PNEUM DA 22F IGG SER-MCNC: 1.1 MCG/ML
S PNEUM DA 23F IGG SER-MCNC: 3.1 MCG/ML
S PNEUM DA 3 IGG SER-MCNC: 0.1 MCG/ML
S PNEUM DA 33F IGG SER-MCNC: 1.2 MCG/ML
S PNEUM DA 4 IGG SER-MCNC: 0.1 MCG/ML
S PNEUM DA 5 IGG SER-MCNC: 0.5 MCG/ML
S PNEUM DA 6B IGG SER-MCNC: 1.2 MCG/ML
S PNEUM DA 7F IGG SER-MCNC: 0.1 MCG/ML
S PNEUM DA 8 IGG SER-MCNC: 0.7 MCG/ML
S PNEUM DA 9N IGG SER-MCNC: 14.3 MCG/ML
S PNEUM DA 9V IGG SER-MCNC: 5.6 MCG/ML

## 2025-03-27 ENCOUNTER — HOSPITAL ENCOUNTER (OUTPATIENT)
Dept: MAMMOGRAPHY | Facility: CLINIC | Age: 55
Discharge: HOME | End: 2025-03-27
Attending: PHYSICIAN ASSISTANT
Payer: COMMERCIAL

## 2025-03-27 DIAGNOSIS — Z12.31 VISIT FOR SCREENING MAMMOGRAM: ICD-10-CM

## 2025-03-27 PROCEDURE — 77063 BREAST TOMOSYNTHESIS BI: CPT

## 2025-03-27 PROCEDURE — 77067 SCR MAMMO BI INCL CAD: CPT

## 2025-04-09 ENCOUNTER — LAB (OUTPATIENT)
Dept: LAB | Facility: OTHER | Age: 55
End: 2025-04-09
Payer: COMMERCIAL

## 2025-04-09 DIAGNOSIS — J32.8 OTHER CHRONIC SINUSITIS: ICD-10-CM

## 2025-04-09 DIAGNOSIS — N95.1 MENOPAUSAL SYNDROME (HOT FLASHES): ICD-10-CM

## 2025-04-09 PROCEDURE — 84144 ASSAY OF PROGESTERONE: CPT

## 2025-04-09 PROCEDURE — 36415 COLL VENOUS BLD VENIPUNCTURE: CPT

## 2025-04-09 PROCEDURE — 83002 ASSAY OF GONADOTROPIN (LH): CPT

## 2025-04-09 PROCEDURE — 99000 SPECIMEN HANDLING OFFICE-LAB: CPT

## 2025-04-09 PROCEDURE — 83001 ASSAY OF GONADOTROPIN (FSH): CPT

## 2025-04-09 PROCEDURE — 86581 STRPTCS PNEUM ANTB SEROT IA: CPT | Mod: 90

## 2025-04-09 PROCEDURE — 82670 ASSAY OF TOTAL ESTRADIOL: CPT

## 2025-04-10 LAB
ESTRADIOL SERPL-MCNC: 8 PG/ML
FSH SERPL IRP2-ACNC: 93.6 MIU/ML
LH SERPL-ACNC: 38.2 MIU/ML
PROGEST SERPL-MCNC: 0.1 NG/ML

## 2025-04-14 ENCOUNTER — PATIENT OUTREACH (OUTPATIENT)
Dept: CARE COORDINATION | Facility: CLINIC | Age: 55
End: 2025-04-14
Payer: COMMERCIAL

## 2025-04-28 ENCOUNTER — PATIENT OUTREACH (OUTPATIENT)
Dept: CARE COORDINATION | Facility: CLINIC | Age: 55
End: 2025-04-28
Payer: COMMERCIAL

## 2025-05-24 DIAGNOSIS — R00.2 PALPITATIONS: ICD-10-CM

## 2025-05-27 RX ORDER — METOPROLOL SUCCINATE 25 MG/1
12.5 TABLET, EXTENDED RELEASE ORAL DAILY
Qty: 45 TABLET | Refills: 0 | Status: SHIPPED | OUTPATIENT
Start: 2025-05-27

## 2025-06-04 DIAGNOSIS — G47.00 INSOMNIA, UNSPECIFIED TYPE: ICD-10-CM

## 2025-06-04 RX ORDER — TRAZODONE HYDROCHLORIDE 50 MG/1
TABLET ORAL
Qty: 270 TABLET | Refills: 0 | Status: SHIPPED | OUTPATIENT
Start: 2025-06-04

## 2025-06-14 ENCOUNTER — HEALTH MAINTENANCE LETTER (OUTPATIENT)
Age: 55
End: 2025-06-14

## 2025-07-01 DIAGNOSIS — J32.8 OTHER CHRONIC SINUSITIS: ICD-10-CM

## 2025-07-01 RX ORDER — AZITHROMYCIN 250 MG/1
TABLET, FILM COATED ORAL
Qty: 12 TABLET | Refills: 0 | Status: SHIPPED | OUTPATIENT
Start: 2025-07-01

## 2025-07-01 NOTE — TELEPHONE ENCOUNTER
Requested Prescriptions   Pending Prescriptions Disp Refills    azithromycin (ZITHROMAX) 250 MG tablet 12 tablet 3     Sig: Take 250 mg by mouth on Mondays, Wednesdays, and Fridays       There is no refill protocol information for this order        Last Written Prescription Date:  3/19/2025  Last Fill Quantity: 12,  # refills: 3   Last office visit: 1/28/2025   Future Office Visit:  7/30/2025    HUNG Fitch, RN, PHN 7/1/2025 9:13 AM

## 2025-07-01 NOTE — TELEPHONE ENCOUNTER
Pending Prescriptions:                       Disp   Refills    azithromycin (ZITHROMAX) 250 MG tablet    12 tab*3            Sig: Take 250 mg by mouth on Mondays, Wednesdays, and           Fridays    Routing refill request to provider for review/approval because:  Drug not on the [facility] refill protocol     Per 3/19/25 Mychart:  Once that course is completed, begin azithromycin 250 mg by mouth on Mondays, Wednesdays, and Fridays as prophylaxis.     Dr Vallejoerea out of clinic, forwarding to on call provider to approve.    Kacy Mcneill MSN, RN   Specialty Clinic, 7/1/2025 1:56 PM

## 2025-07-23 ENCOUNTER — MYC MEDICAL ADVICE (OUTPATIENT)
Dept: FAMILY MEDICINE | Facility: CLINIC | Age: 55
End: 2025-07-23
Payer: COMMERCIAL

## 2025-07-23 DIAGNOSIS — E34.9 HORMONE IMBALANCE: Primary | ICD-10-CM

## 2025-07-23 NOTE — TELEPHONE ENCOUNTER
Patient asking for estrogen patch until she sees OB in October. Please avise.    Julienne Tucker RN on 7/23/2025 at 11:01 AM

## 2025-07-24 RX ORDER — ESTRADIOL 0.05 MG/D
1 PATCH, EXTENDED RELEASE TRANSDERMAL
Qty: 24 PATCH | Refills: 1 | Status: SHIPPED | OUTPATIENT
Start: 2025-07-24

## 2025-07-28 DIAGNOSIS — I10 ESSENTIAL HYPERTENSION: ICD-10-CM

## 2025-07-28 RX ORDER — IRBESARTAN AND HYDROCHLOROTHIAZIDE 150; 12.5 MG/1; MG/1
1 TABLET, FILM COATED ORAL DAILY
Qty: 90 TABLET | Refills: 3 | Status: SHIPPED | OUTPATIENT
Start: 2025-07-28

## 2025-07-29 NOTE — PATIENT INSTRUCTIONS
Based on our discussion, I have outlined the following instructions for you:      - Use the azelastine nasal spray 2 sprays in each nostril twice daily as needed.    - Take 500 mg of azithromycin (Z-Stewart) on .  and . EKG with PCP in 6 months.   - Keep using budesonide irrigations as you have been.  - Visit [facility].  - Consider allergy immunotherapy to help manage your allergies.    - Use Symbicort when you feel you need it to control your asthma.    Thank you again for your visit, and we look forward to supporting you in your journey to better health.      Dr Iglesias [phone]    All visits for food challenges, venom allergy testing, and medication/drug challenges MUST be scheduled through the allergy clinic nurse. Please send a [employer] message or call the allergy scheduling line and ask to speak with Dr Iglesias's team for scheduling these appointments. Appointments for these visits that are made through the schedulers or via [employer] may be cancelled or rescheduled.    Allergy Shot Scheduling ([place]): [phone]    Pulmonary Function [phone]    Prescription Assistance  If you need assistance with your prescriptions (cost, coverage, etc) please contact: [place] [facility] [phone]

## 2025-07-29 NOTE — PROGRESS NOTES
SUBJECTIVE:                                                                   Dot Osborn presents today to our Allergy Clinic at [facility] for a follow up visit. She is a 55 year old female with allergic rhinitis, chronic sinusitis, specific antibody deficiency, and asthma.    She has a longstanding history of sinus infections, which began in her late 30s to early 40s. She underwent sinus surgery in her 40s, but despite this, she continues to experience sinus symptoms even in the absence of active infections.    In October 2024, serum IgE testing for the regional aeroallergen panel revealed sensitivities to cat, dog, Alternaria mold, and dust mites, with slight sensitivities to horse, grass pollen, tree pollen, Epicoccum mold, and weed pollen.    A sinus CT performed in March 2023 showed a deviated nasal septum, postoperative changes, and evidence of chronic sinus disease.    A primary immunodeficiency workup was unremarkable except for suboptimal pneumococcal antibody levels. In 2024, pneumococcal antibody levels were 0/23 protective. In April 2025, 12/23 protective, which was still suboptimal, indicating possible specific antibody deficiency.   History of asthma when she was a teenager.  Symptoms triggered by respiratory infections and exposure to cats or horses.    The patient reports good asthma control using Symbicort as needed, with use limited to approximately once per week. She denies any emergency room, urgent care, or other medical provider visits for asthma exacerbation since her last visit. She also has not required oral prednisone for asthma flares during this time.    Regarding allergic rhinitis and chronic sinus symptoms, she continues to use budesonide nasal irrigations as previously instructed. Although she does not like using them, she believes that in combination with azithromycin 250 mg on Mondays, Wednesdays, and Fridays, her symptoms have improved overall.  However, she still experiences nasal congestion and intermittent discolored nasal discharge.    Since her last visit in 2025, she has required at least two courses of antibiotics, which provided only temporary relief. She continues to feel that azithromycin three times per week is somewhat effective in managing her symptoms.    She could not tolerate Augmentin due to gastrointestinal side effects when she was prescribed it within the past 6 months.       Patient Active Problem List   Diagnosis    Palpitations    Essential hypertension    Hyperlipidemia LDL goal <100    Anxiety state    Insomnia, unspecified    Reactive airway disease    Tibia/fibula fracture    Allergic rhinitis    Allergic rhinitis due to animals    Allergic rhinitis caused by mold    Allergic rhinitis due to dust mite       Past Medical History:   Diagnosis Date    Cancer (H)     Elevated BP without diagnosis of hypertension     Hypertension     Uncomplicated asthma     I have had since childhood      Problem (# of Occurrences) Relation (Name,Age of Onset)    Coronary Artery Disease (1) Maternal Grandmother (Maggie Javier)    Brain Tumor (1) Mother: Glioblastoma - passed in           Past Surgical History:   Procedure Laterality Date    GYN SURGERY  Cervical    HYSTERECTOMY, PAP NO LONGER INDICATED      XR ANKLE SURGERY MARY LEFT Right      Social History     Socioeconomic History    Marital status:      Spouse name: None    Number of children: None    Years of education: None    Highest education level: None   Tobacco Use    Smoking status: Former     Current packs/day: 0.00     Types: Cigarettes     Start date: 1991     Quit date: 2005     Years since quittin.0    Smokeless tobacco: Never    Tobacco comments:     rarely smokes, 10/month   Vaping Use    Vaping status: Never Used   Substance and Sexual Activity    Alcohol use: Yes     Comment: occasionally    Drug use: Never    Sexual activity: Yes      Partners: Male     Birth control/protection: Female Surgical   Other Topics Concern    Parent/sibling w/ CABG, MI or angioplasty before 65F 55M? No   Social History Narrative    July 30, 2025        ENVIRONMENTAL HISTORY: [place] is heated with a forced air. They does not have central air conditioning. The patient's bedroom is furnished with hard karyn in bedroom and allergen pillowcase cover.  Pets inside the house include 0. There is/are 1 smokers in the house ( is an outdoor smoker).  The house does have a damp basement.      Social Drivers of Health     Financial Resource Strain: Low Risk  (5/14/2024)    Financial Resource Strain     Within the past 12 months, have you or your family members you live with been unable to get utilities (heat, electricity) when it was really needed?: No   Food Insecurity: Low Risk  (5/14/2024)    Food Insecurity     Within the past 12 months, did you worry that your food would run out before you got money to buy more?: No     Within the past 12 months, did the food you bought just not last and you didn t have money to get more?: No   Transportation Needs: Low Risk  (5/14/2024)    Transportation Needs     Within the past 12 months, has lack of transportation kept you from medical appointments, getting your medicines, non-medical meetings or appointments, work, or from getting things that you need?: No   Physical Activity: Insufficiently Active (5/14/2024)    Exercise Vital Sign     Days of Exercise per Week: 3 days     Minutes of Exercise per Session: 30 min   Stress: No Stress Concern Present (5/14/2024)    [employer] - Occupational Stress Questionnaire     Feeling of Stress : Not at all   Social Connections: Unknown (5/14/2024)    Social Connection and Isolation Panel [NHANES]     Frequency of Social Gatherings with Friends and Family: Once a week   Interpersonal Safety: Low Risk   (5/14/2024)    Interpersonal Safety     Do you feel physically and emotionally safe where you currently live?: Yes     Within the past 12 months, have you been hit, slapped, kicked or otherwise physically hurt by someone?: No     Within the past 12 months, have you been humiliated or emotionally abused in other ways by your partner or ex-partner?: No   Housing Stability: Low Risk  (5/14/2024)    Housing Stability     Do you have housing? : Yes     Are you worried about losing your housing?: No             Current Outpatient Medications:     azelastine (ASTELIN) 0.1 % nasal spray, Spray 2 sprays into both nostrils 2 times daily as needed for rhinitis., Disp: 30 mL, Rfl: 3    azithromycin (ZITHROMAX) 500 MG tablet, Take 500 mg by mouth on Mondays, Wednesdays, and Fridays, Disp: 12 tablet, Rfl: 4    budesonide (PULMICORT) 0.5 MG/2ML neb solution, Mix respule of 0.5mg/2 mL budesonide vial in 8oz normal saline sinus rinse bottle. Irrigate each nostril with half of the bottle twice daily, Disp: 120 mL, Rfl: 2    budesonide-formoterol (SYMBICORT) 80-4.5 MCG/ACT inhaler, Inhale 2 puffs into the lungs every 4 hours as needed (Wheezing, chest tightness, shortness of breath, or persistent cough)., Disp: 10.2 g, Rfl: 0    estradiol (VIVELLE-DOT) 0.05 MG/24HR bi-weekly patch, Place 1 patch onto the skin twice a week, Disp: 24 patch, Rfl: 1    irbesartan-hydrochlorothiazide (AVALIDE) 150-12.5 MG tablet, TAKE ONE TABLET BY MOUTH ONCE DAILY, Disp: 90 tablet, Rfl: 3    metoprolol succinate ER (TOPROL XL) 25 MG 24 hr tablet, Take 0.5 tablets (12.5 mg) by mouth daily., Disp: 45 tablet, Rfl: 0    multivitamin w/minerals (THERA-VIT-M) tablet, Take 1 tablet by mouth daily, Disp: , Rfl:     omega 3 1000 MG CAPS, , Disp: , Rfl:     traZODone (DESYREL) 50 MG tablet, TAKE 1 TO 4 TABLETS BY MOUTH AT BEDTIME, Disp: 270 tablet, Rfl: 0    albuterol (PROAIR HFA/PROVENTIL HFA/VENTOLIN HFA) 108 (90 Base) MCG/ACT inhaler, Inhale 2 puffs into the  lungs every 6 hours (Patient not taking: Reported on 7/30/2025), Disp: 18 g, Rfl: 0    Cetirizine HCl (ZYRTEC ALLERGY PO), , Disp: , Rfl:     cyclobenzaprine (FLEXERIL) 10 MG tablet, Take 1 tablet (10 mg) by mouth at bedtime. (Patient not taking: Reported on 1/28/2025), Disp: 30 tablet, Rfl: 0    fluticasone (FLONASE) 50 MCG/ACT nasal spray, Spray 1 spray into both nostrils daily (Patient not taking: Reported on 1/28/2025), Disp: , Rfl:     LORazepam (ATIVAN) 1 MG tablet, Take 1 tablet (1 mg) by mouth every 6 hours as needed for anxiety. (Patient not taking: Reported on 7/30/2025), Disp: 20 tablet, Rfl: 0    ondansetron (ZOFRAN) 4 MG tablet, Take 1 tablet (4 mg) by mouth every 8 hours as needed for nausea (Patient not taking: Reported on 7/30/2025), Disp: 20 tablet, Rfl: 2  Immunization History   Administered Date(s) Administered    COVID-19 Monovalent 18+ (Moderna) 02/04/2021, 03/04/2021, 11/08/2021    Influenza (IIV3) PF 11/05/1997, 10/21/2010    Influenza Vaccine >6 months,quad, PF 10/09/2017, 10/18/2018, 11/05/2019, 11/08/2021, 12/11/2023    Influenza,INJ,MDCK,PF,Quad >6mo(Flucelvax) 11/15/2020    Pneumococcal 23 valent 01/28/2025    TDAP (Adacel,Boostrix) 10/10/2012, 05/14/2024    Zoster recombinant adjuvanted (Shingrix) 04/11/2022, 12/11/2023     Allergies   Allergen Reactions    Amoxicillin Nausea and Vomiting and GI Disturbance    Lisinopril      cough    Sulfa Antibiotics Difficulty breathing, Nausea and Swelling     OBJECTIVE:                                                                 /68   Pulse 72   Wt 61.7 kg (136 lb 0.4 oz)   SpO2 100%   BMI 22.29 kg/m          Physical Exam  Vitals and nursing note reviewed.   Constitutional:       General: She is not in acute distress.     Appearance: She is not ill-appearing, toxic-appearing or diaphoretic.   HENT:      Head: Normocephalic and atraumatic.      Right Ear: Tympanic membrane, ear canal and external ear normal.      Left Ear: Tympanic  membrane, ear canal and external ear normal.      Nose: No mucosal edema, congestion or rhinorrhea.      Right Turbinates: Enlarged (mildly).      Left Turbinates: Enlarged (mildly).      Mouth/Throat:      Lips: Pink.      Mouth: Mucous membranes are moist.      Pharynx: Oropharynx is clear. No pharyngeal swelling, oropharyngeal exudate, posterior oropharyngeal erythema or uvula swelling.   Eyes:      General:         Right eye: No discharge.         Left eye: No discharge.      Conjunctiva/sclera: Conjunctivae normal.   Cardiovascular:      Rate and Rhythm: Normal rate and regular rhythm.      Heart sounds: Normal heart sounds. No murmur heard.  Pulmonary:      Effort: Pulmonary effort is normal. No respiratory distress.      Breath sounds: Normal breath sounds and air entry. No stridor, decreased air movement or transmitted upper airway sounds. No decreased breath sounds, wheezing, rhonchi or rales.   Neurological:      Mental Status: She is alert and oriented to person, place, and time.   Psychiatric:         Mood and Affect: Mood normal.         Behavior: Behavior normal.       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 While her symptoms have improved with current therapy, she remains persistently symptomatic.    Given the chronicity and partial response, I recommend a more aggressive treatment approach.    She should continue budesonide/saline irrigations as previously directed.  Azelastine may be used 2 sprays per nostril twice daily as needed for nasal symptoms.    We discussed the option of allergen immunotherapy. I provided relevant billing codes so she  can verify coverage with her insurance provider. An informational pamphlet was also given, and we reviewed the basics of immunotherapy during today s visit.    I recommend increasing azithromycin to 500 mg on Mondays, Wednesdays, and Fridays. Given the rare but potential risk of QT interval prolongation and torsades de pointes, I advised her to obtain an EKG through her PCP in approximately 6 months.    A referral to [facility] has been placed to evaluate whether she may qualify for immunoglobulin replacement therapy. Although she is hesitant about this option, it may be appropriate depending on further immunologic workup.    If she continues to experience recurrent sinus infections, it would be reasonable to pursue ENT evaluation and obtain a sinus culture for more targeted management.  She was previously evaluated by Dr. Felix.  - Immunology Referral  - azelastine (ASTELIN) 0.1 % nasal spray  Dispense: 30 mL; Refill: 3  - budesonide (PULMICORT) 0.5 MG/2ML neb solution  Dispense: 120 mL; Refill: 2  - azelastine (ASTELIN) 0.1 % nasal spray  Dispense: 30 mL; Refill: 3  - azithromycin (ZITHROMAX) 500 MG tablet  Dispense: 12 tablet; Refill: 4    Mild intermittent asthma without complication    Well-controlled with Symbicort as needed.  - Continue as is.  - budesonide-formoterol (SYMBICORT) 80-4.5 MCG/ACT inhaler  Dispense: 10.2 g; Refill: 0    Follow-up in 3 months or sooner if needed.    Thank you for allowing us to participate in the care of this patient. Please feel free to contact us if there are any questions or concerns about the patient.    Disclaimer: This note consists of symbols derived from keyboarding, dictation and/or voice recognition software. As a result, there may be errors in the script that have gone undetected. Please consider this when interpreting information found in this chart.    Consent was obtained from the patient to use an AI documentation tool in the creation of this note.      Best Iglesias MD, FAAAAI, FACAAI  Allergy, Asthma and Immunology     [facility]

## 2025-07-30 ENCOUNTER — OFFICE VISIT (OUTPATIENT)
Dept: ALLERGY | Facility: OTHER | Age: 55
End: 2025-07-30
Attending: ALLERGY & IMMUNOLOGY
Payer: COMMERCIAL

## 2025-07-30 VITALS
SYSTOLIC BLOOD PRESSURE: 102 MMHG | OXYGEN SATURATION: 100 % | WEIGHT: 136.02 LBS | HEART RATE: 72 BPM | DIASTOLIC BLOOD PRESSURE: 68 MMHG | BODY MASS INDEX: 22.29 KG/M2

## 2025-07-30 DIAGNOSIS — D80.6 SPECIFIC ANTIBODY DEFICIENCY WITH NORMAL IG CONCENTRATION AND NORMAL NUMBER OF B CELLS: ICD-10-CM

## 2025-07-30 DIAGNOSIS — J45.20 MILD INTERMITTENT ASTHMA WITHOUT COMPLICATION: ICD-10-CM

## 2025-07-30 DIAGNOSIS — J32.8 OTHER CHRONIC SINUSITIS: Primary | ICD-10-CM

## 2025-07-30 DIAGNOSIS — J30.81 ALLERGIC RHINITIS DUE TO ANIMALS: ICD-10-CM

## 2025-07-30 DIAGNOSIS — J30.89 ALLERGIC RHINITIS DUE TO DUST MITE: ICD-10-CM

## 2025-07-30 DIAGNOSIS — J30.89 ALLERGIC RHINITIS CAUSED BY MOLD: ICD-10-CM

## 2025-07-30 PROCEDURE — 99214 OFFICE O/P EST MOD 30 MIN: CPT | Performed by: ALLERGY & IMMUNOLOGY

## 2025-07-30 PROCEDURE — 3078F DIAST BP <80 MM HG: CPT | Performed by: ALLERGY & IMMUNOLOGY

## 2025-07-30 PROCEDURE — 3074F SYST BP LT 130 MM HG: CPT | Performed by: ALLERGY & IMMUNOLOGY

## 2025-07-30 RX ORDER — BUDESONIDE 0.5 MG/2ML
INHALANT ORAL
Qty: 120 ML | Refills: 2 | Status: SHIPPED | OUTPATIENT
Start: 2025-07-30

## 2025-07-30 RX ORDER — AZELASTINE 1 MG/ML
2 SPRAY, METERED NASAL 2 TIMES DAILY PRN
Qty: 30 ML | Refills: 3 | Status: SHIPPED | OUTPATIENT
Start: 2025-07-30

## 2025-07-30 RX ORDER — AZITHROMYCIN 500 MG/1
TABLET, FILM COATED ORAL
Qty: 12 TABLET | Refills: 4 | Status: SHIPPED | OUTPATIENT
Start: 2025-07-30

## 2025-07-30 RX ORDER — BUDESONIDE AND FORMOTEROL FUMARATE DIHYDRATE 80; 4.5 UG/1; UG/1
2 AEROSOL RESPIRATORY (INHALATION) EVERY 4 HOURS PRN
Qty: 10.2 G | Refills: 0 | Status: SHIPPED | OUTPATIENT
Start: 2025-07-30

## 2025-07-30 ASSESSMENT — ASTHMA QUESTIONNAIRES: ACT_TOTALSCORE: 24

## 2025-07-30 NOTE — LETTER
7/30/2025      Dot Osborn  [address] [place] [address]      Dear Colleague,    Thank you for referring your patient, Dot Osborn, to the [facility]. Please see a copy of my visit note below.    SUBJECTIVE:                                                                   Dot Osborn presents today to our Allergy Clinic at [facility] for a follow up visit. She is a 55 year old female with allergic rhinitis, chronic sinusitis, specific antibody deficiency, and asthma.    She has a longstanding history of sinus infections, which began in her late 30s to early 40s. She underwent sinus surgery in her 40s, but despite this, she continues to experience sinus symptoms even in the absence of active infections.    In October 2024, serum IgE testing for the regional aeroallergen panel revealed sensitivities to cat, dog, Alternaria mold, and dust mites, with slight sensitivities to horse, grass pollen, tree pollen, Epicoccum mold, and weed pollen.    A sinus CT performed in March 2023 showed a deviated nasal septum, postoperative changes, and evidence of chronic sinus disease.    A primary immunodeficiency workup was unremarkable except for suboptimal pneumococcal antibody levels. In 2024, pneumococcal antibody levels were 0/23 protective. In April 2025, 12/23 protective, which was still suboptimal, indicating possible specific antibody deficiency.   History of asthma when she was a teenager.  Symptoms triggered by respiratory infections and exposure to cats or horses.    The patient reports good asthma control using Symbicort as needed, with use limited to approximately once per week. She denies any emergency room, urgent care, or other medical provider visits for asthma exacerbation since her last visit. She also has not required oral prednisone for asthma flares during this time.    Regarding allergic rhinitis and chronic sinus  symptoms, she continues to use budesonide nasal irrigations as previously instructed. Although she does not like using them, she believes that in combination with azithromycin 250 mg on Mondays, Wednesdays, and Fridays, her symptoms have improved overall. However, she still experiences nasal congestion and intermittent discolored nasal discharge.    Since her last visit in January 2025, she has required at least two courses of antibiotics, which provided only temporary relief. She continues to feel that azithromycin three times per week is somewhat effective in managing her symptoms.    She could not tolerate Augmentin due to gastrointestinal side effects when she was prescribed it within the past 6 months.       Patient Active Problem List   Diagnosis     Palpitations     Essential hypertension     Hyperlipidemia LDL goal <100     Anxiety state     Insomnia, unspecified     Reactive airway disease     Tibia/fibula fracture     Allergic rhinitis     Allergic rhinitis due to animals     Allergic rhinitis caused by mold     Allergic rhinitis due to dust mite       Past Medical History:   Diagnosis Date     Cancer (H) 1991,93     Elevated BP without diagnosis of hypertension      Hypertension 2019     Uncomplicated asthma 2000    I have had since childhood      Problem (# of Occurrences) Relation (Name,Age of Onset)    Coronary Artery Disease (1) Maternal Grandmother (Maggie Javier)    Brain Tumor (1) Mother: Glioblastoma - passed in 2025          Past Surgical History:   Procedure Laterality Date     GYN SURGERY  Cervical     HYSTERECTOMY, PAP NO LONGER INDICATED       XR ANKLE SURGERY MARY LEFT Right      Social History     Socioeconomic History     Marital status:      Spouse name: None     Number of children: None     Years of education: None     Highest education level: None   Tobacco Use     Smoking status: Former     Current packs/day: 0.00     Types: Cigarettes     Start date: 8/1/1991     Quit date:  2005     Years since quittin.0     Smokeless tobacco: Never     Tobacco comments:     rarely smokes, 10/month   Vaping Use     Vaping status: Never Used   Substance and Sexual Activity     Alcohol use: Yes     Comment: occasionally     Drug use: Never     Sexual activity: Yes     Partners: Male     Birth control/protection: Female Surgical   Other Topics Concern     Parent/sibling w/ CABG, MI or angioplasty before 65F 55M? No   Social History Narrative    2025        ENVIRONMENTAL HISTORY: [place] The [place] is heated with a forced air. They does not have central air conditioning. The patient's bedroom is furnished with hard karyn in bedroom and allergen pillowcase cover.  Pets inside the house include 0. There is/are 1 smokers in the house ( is an outdoor smoker).  The house does have a damp basement.      Social Drivers of Health     Financial Resource Strain: Low Risk  (2024)    Financial Resource Strain      Within the past 12 months, have you or your family members you live with been unable to get utilities (heat, electricity) when it was really needed?: No   Food Insecurity: Low Risk  (2024)    Food Insecurity      Within the past 12 months, did you worry that your food would run out before you got money to buy more?: No      Within the past 12 months, did the food you bought just not last and you didn t have money to get more?: No   Transportation Needs: Low Risk  (2024)    Transportation Needs      Within the past 12 months, has lack of transportation kept you from medical appointments, getting your medicines, non-medical meetings or appointments, work, or from getting things that you need?: No   Physical Activity: Insufficiently Active (2024)    Exercise Vital Sign      Days of Exercise per Week: 3 days      Minutes of Exercise per Session: 30 min   Stress: No Stress Concern Present (2024)    [employer] - Occupational Stress Questionnaire      Feeling of Stress : Not at all   Social Connections: Unknown (5/14/2024)    Social Connection and Isolation Panel [NHANES]      Frequency of Social Gatherings with Friends and Family: Once a week   Interpersonal Safety: Low Risk  (5/14/2024)    Interpersonal Safety      Do you feel physically and emotionally safe where you currently live?: Yes      Within the past 12 months, have you been hit, slapped, kicked or otherwise physically hurt by someone?: No      Within the past 12 months, have you been humiliated or emotionally abused in other ways by your partner or ex-partner?: No   Housing Stability: Low Risk  (5/14/2024)    Housing Stability      Do you have housing? : Yes      Are you worried about losing your housing?: No             Current Outpatient Medications:      azelastine (ASTELIN) 0.1 % nasal spray, Spray 2 sprays into both nostrils 2 times daily as needed for rhinitis., Disp: 30 mL, Rfl: 3     azithromycin (ZITHROMAX) 500 MG tablet, Take 500 mg by mouth on Mondays, Wednesdays, and Fridays, Disp: 12 tablet, Rfl: 4     budesonide (PULMICORT) 0.5 MG/2ML neb solution, Mix respule of 0.5mg/2 mL budesonide vial in 8oz normal saline sinus rinse bottle. Irrigate each nostril with half of the bottle twice daily, Disp: 120 mL, Rfl: 2     budesonide-formoterol (SYMBICORT) 80-4.5 MCG/ACT inhaler, Inhale 2 puffs into the lungs every 4 hours as needed (Wheezing, chest tightness, shortness of breath, or persistent cough)., Disp: 10.2 g, Rfl: 0     estradiol (VIVELLE-DOT) 0.05 MG/24HR bi-weekly patch, Place 1 patch onto the skin twice a week, Disp: 24 patch, Rfl: 1     irbesartan-hydrochlorothiazide (AVALIDE) 150-12.5 MG tablet, TAKE ONE TABLET BY MOUTH ONCE DAILY, Disp: 90 tablet, Rfl: 3     metoprolol succinate ER (TOPROL XL) 25 MG 24 hr tablet, Take 0.5 tablets (12.5 mg) by mouth daily., Disp: 45 tablet, Rfl: 0     multivitamin w/minerals (THERA-VIT-M)  tablet, Take 1 tablet by mouth daily, Disp: , Rfl:      omega 3 1000 MG CAPS, , Disp: , Rfl:      traZODone (DESYREL) 50 MG tablet, TAKE 1 TO 4 TABLETS BY MOUTH AT BEDTIME, Disp: 270 tablet, Rfl: 0     albuterol (PROAIR HFA/PROVENTIL HFA/VENTOLIN HFA) 108 (90 Base) MCG/ACT inhaler, Inhale 2 puffs into the lungs every 6 hours (Patient not taking: Reported on 7/30/2025), Disp: 18 g, Rfl: 0     Cetirizine HCl (ZYRTEC ALLERGY PO), , Disp: , Rfl:      cyclobenzaprine (FLEXERIL) 10 MG tablet, Take 1 tablet (10 mg) by mouth at bedtime. (Patient not taking: Reported on 1/28/2025), Disp: 30 tablet, Rfl: 0     fluticasone (FLONASE) 50 MCG/ACT nasal spray, Spray 1 spray into both nostrils daily (Patient not taking: Reported on 1/28/2025), Disp: , Rfl:      LORazepam (ATIVAN) 1 MG tablet, Take 1 tablet (1 mg) by mouth every 6 hours as needed for anxiety. (Patient not taking: Reported on 7/30/2025), Disp: 20 tablet, Rfl: 0     ondansetron (ZOFRAN) 4 MG tablet, Take 1 tablet (4 mg) by mouth every 8 hours as needed for nausea (Patient not taking: Reported on 7/30/2025), Disp: 20 tablet, Rfl: 2  Immunization History   Administered Date(s) Administered     COVID-19 Monovalent 18+ (Moderna) 02/04/2021, 03/04/2021, 11/08/2021     Influenza (IIV3) PF 11/05/1997, 10/21/2010     Influenza Vaccine >6 months,quad, PF 10/09/2017, 10/18/2018, 11/05/2019, 11/08/2021, 12/11/2023     Influenza,INJ,MDCK,PF,Quad >6mo(Flucelvax) 11/15/2020     Pneumococcal 23 valent 01/28/2025     TDAP (Adacel,Boostrix) 10/10/2012, 05/14/2024     Zoster recombinant adjuvanted (Shingrix) 04/11/2022, 12/11/2023     Allergies   Allergen Reactions     Amoxicillin Nausea and Vomiting and GI Disturbance     Lisinopril      cough     Sulfa Antibiotics Difficulty breathing, Nausea and Swelling     OBJECTIVE:                                                                 /68   Pulse 72   Wt 61.7 kg (136 lb 0.4 oz)   SpO2 100%   BMI 22.29 kg/m          Physical  Exam  Vitals and nursing note reviewed.   Constitutional:       General: She is not in acute distress.     Appearance: She is not ill-appearing, toxic-appearing or diaphoretic.   HENT:      Head: Normocephalic and atraumatic.      Right Ear: Tympanic membrane, ear canal and external ear normal.      Left Ear: Tympanic membrane, ear canal and external ear normal.      Nose: No mucosal edema, congestion or rhinorrhea.      Right Turbinates: Enlarged (mildly).      Left Turbinates: Enlarged (mildly).      Mouth/Throat:      Lips: Pink.      Mouth: Mucous membranes are moist.      Pharynx: Oropharynx is clear. No pharyngeal swelling, oropharyngeal exudate, posterior oropharyngeal erythema or uvula swelling.   Eyes:      General:         Right eye: No discharge.         Left eye: No discharge.      Conjunctiva/sclera: Conjunctivae normal.   Cardiovascular:      Rate and Rhythm: Normal rate and regular rhythm.      Heart sounds: Normal heart sounds. No murmur heard.  Pulmonary:      Effort: Pulmonary effort is normal. No respiratory distress.      Breath sounds: Normal breath sounds and air entry. No stridor, decreased air movement or transmitted upper airway sounds. No decreased breath sounds, wheezing, rhonchi or rales.   Neurological:      Mental Status: She is alert and oriented to person, place, and time.   Psychiatric:         Mood and Affect: Mood normal.         Behavior: Behavior normal.       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 While her symptoms have improved with current therapy, she remains persistently symptomatic.    Given the chronicity and partial  response, I recommend a more aggressive treatment approach.    She should continue budesonide/saline irrigations as previously directed.  Azelastine may be used 2 sprays per nostril twice daily as needed for nasal symptoms.    We discussed the option of allergen immunotherapy. I provided relevant billing codes so she can verify coverage with her insurance provider. An informational pamphlet was also given, and we reviewed the basics of immunotherapy during today s visit.    I recommend increasing azithromycin to 500 mg on Mondays, Wednesdays, and Fridays. Given the rare but potential risk of QT interval prolongation and torsades de pointes, I advised her to obtain an EKG through her PCP in approximately 6 months.    A referral to [facility] has been placed to evaluate whether she may qualify for immunoglobulin replacement therapy. Although she is hesitant about this option, it may be appropriate depending on further immunologic workup.    If she continues to experience recurrent sinus infections, it would be reasonable to pursue ENT evaluation and obtain a sinus culture for more targeted management.  She was previously evaluated by Dr. Felix.  - Immunology Referral  - azelastine (ASTELIN) 0.1 % nasal spray  Dispense: 30 mL; Refill: 3  - budesonide (PULMICORT) 0.5 MG/2ML neb solution  Dispense: 120 mL; Refill: 2  - azelastine (ASTELIN) 0.1 % nasal spray  Dispense: 30 mL; Refill: 3  - azithromycin (ZITHROMAX) 500 MG tablet  Dispense: 12 tablet; Refill: 4    Mild intermittent asthma without complication    Well-controlled with Symbicort as needed.  - Continue as is.  - budesonide-formoterol (SYMBICORT) 80-4.5 MCG/ACT inhaler  Dispense: 10.2 g; Refill: 0    Follow-up in 3 months or sooner if needed.    Thank you for allowing us to participate in the care of this patient. Please feel free to contact us if there are any questions or concerns about the patient.    Disclaimer: This note consists of symbols  derived from keyboarding, dictation and/or voice recognition software. As a result, there may be errors in the script that have gone undetected. Please consider this when interpreting information found in this chart.    Consent was obtained from the patient to use an AI documentation tool in the creation of this note.     Best Iglesias MD, FAAAAI, FACAAI  Allergy, Asthma and Immunology     [facility]      Again, thank you for allowing me to participate in the care of your patient.        Sincerely,        Best Iglesias MD    Electronically signed

## 2025-08-11 ENCOUNTER — OFFICE VISIT (OUTPATIENT)
Dept: FAMILY MEDICINE | Facility: CLINIC | Age: 55
End: 2025-08-11
Payer: COMMERCIAL

## 2025-08-11 VITALS
HEART RATE: 72 BPM | OXYGEN SATURATION: 97 % | RESPIRATION RATE: 12 BRPM | WEIGHT: 137 LBS | SYSTOLIC BLOOD PRESSURE: 110 MMHG | HEIGHT: 65 IN | DIASTOLIC BLOOD PRESSURE: 68 MMHG | TEMPERATURE: 98.5 F | BODY MASS INDEX: 22.82 KG/M2

## 2025-08-11 DIAGNOSIS — F41.8 SITUATIONAL ANXIETY: ICD-10-CM

## 2025-08-11 PROCEDURE — 3074F SYST BP LT 130 MM HG: CPT | Performed by: PHYSICIAN ASSISTANT

## 2025-08-11 PROCEDURE — 99213 OFFICE O/P EST LOW 20 MIN: CPT | Performed by: PHYSICIAN ASSISTANT

## 2025-08-11 PROCEDURE — 3078F DIAST BP <80 MM HG: CPT | Performed by: PHYSICIAN ASSISTANT

## 2025-08-11 PROCEDURE — 1126F AMNT PAIN NOTED NONE PRSNT: CPT | Performed by: PHYSICIAN ASSISTANT

## 2025-08-11 RX ORDER — LORAZEPAM 1 MG/1
1 TABLET ORAL EVERY 6 HOURS PRN
Qty: 20 TABLET | Refills: 0 | Status: SHIPPED | OUTPATIENT
Start: 2025-08-11

## 2025-08-11 ASSESSMENT — PATIENT HEALTH QUESTIONNAIRE - PHQ9
10. IF YOU CHECKED OFF ANY PROBLEMS, HOW DIFFICULT HAVE THESE PROBLEMS MADE IT FOR YOU TO DO YOUR WORK, TAKE CARE OF THINGS AT HOME, OR GET ALONG WITH OTHER PEOPLE: SOMEWHAT DIFFICULT
SUM OF ALL RESPONSES TO PHQ QUESTIONS 1-9: 6
SUM OF ALL RESPONSES TO PHQ QUESTIONS 1-9: 6

## 2025-08-11 ASSESSMENT — ANXIETY QUESTIONNAIRES
2. NOT BEING ABLE TO STOP OR CONTROL WORRYING: SEVERAL DAYS
1. FEELING NERVOUS, ANXIOUS, OR ON EDGE: NEARLY EVERY DAY
4. TROUBLE RELAXING: SEVERAL DAYS
3. WORRYING TOO MUCH ABOUT DIFFERENT THINGS: SEVERAL DAYS
8. IF YOU CHECKED OFF ANY PROBLEMS, HOW DIFFICULT HAVE THESE MADE IT FOR YOU TO DO YOUR WORK, TAKE CARE OF THINGS AT HOME, OR GET ALONG WITH OTHER PEOPLE?: SOMEWHAT DIFFICULT
IF YOU CHECKED OFF ANY PROBLEMS ON THIS QUESTIONNAIRE, HOW DIFFICULT HAVE THESE PROBLEMS MADE IT FOR YOU TO DO YOUR WORK, TAKE CARE OF THINGS AT HOME, OR GET ALONG WITH OTHER PEOPLE: SOMEWHAT DIFFICULT
GAD7 TOTAL SCORE: 6
7. FEELING AFRAID AS IF SOMETHING AWFUL MIGHT HAPPEN: NOT AT ALL
6. BECOMING EASILY ANNOYED OR IRRITABLE: NOT AT ALL
7. FEELING AFRAID AS IF SOMETHING AWFUL MIGHT HAPPEN: NOT AT ALL
GAD7 TOTAL SCORE: 6
GAD7 TOTAL SCORE: 6
5. BEING SO RESTLESS THAT IT IS HARD TO SIT STILL: NOT AT ALL

## 2025-08-11 ASSESSMENT — ENCOUNTER SYMPTOMS: NERVOUS/ANXIOUS: 1

## 2025-08-11 ASSESSMENT — PAIN SCALES - GENERAL: PAINLEVEL_OUTOF10: NO PAIN (0)

## 2025-08-23 DIAGNOSIS — R00.2 PALPITATIONS: ICD-10-CM

## 2025-08-25 RX ORDER — METOPROLOL SUCCINATE 25 MG/1
12.5 TABLET, EXTENDED RELEASE ORAL DAILY
Qty: 45 TABLET | Refills: 1 | Status: SHIPPED | OUTPATIENT
Start: 2025-08-25